# Patient Record
Sex: MALE | Race: WHITE | Employment: OTHER | URBAN - METROPOLITAN AREA
[De-identification: names, ages, dates, MRNs, and addresses within clinical notes are randomized per-mention and may not be internally consistent; named-entity substitution may affect disease eponyms.]

---

## 2018-12-17 LAB
ALBUMIN SERPL-MCNC: NORMAL G/DL
ALP BLD-CCNC: NORMAL U/L
ALT SERPL-CCNC: NORMAL U/L
ANION GAP SERPL CALCULATED.3IONS-SCNC: NORMAL MMOL/L
AST SERPL-CCNC: NORMAL U/L
BASOPHILS ABSOLUTE: NORMAL
BASOPHILS RELATIVE PERCENT: NORMAL
BILIRUB SERPL-MCNC: NORMAL MG/DL
BUN BLDV-MCNC: NORMAL MG/DL
CALCIUM SERPL-MCNC: NORMAL MG/DL
CHLORIDE BLD-SCNC: NORMAL MMOL/L
CHOLESTEROL, TOTAL: NORMAL
CHOLESTEROL/HDL RATIO: NORMAL
CO2: NORMAL
CREAT SERPL-MCNC: NORMAL MG/DL
EOSINOPHILS ABSOLUTE: NORMAL
EOSINOPHILS RELATIVE PERCENT: NORMAL
GFR CALCULATED: NORMAL
GLUCOSE BLD-MCNC: NORMAL MG/DL
HCT VFR BLD CALC: NORMAL %
HDLC SERPL-MCNC: NORMAL MG/DL
HEMOGLOBIN: NORMAL
LDL CHOLESTEROL CALCULATED: NORMAL
LYMPHOCYTES ABSOLUTE: NORMAL
LYMPHOCYTES RELATIVE PERCENT: NORMAL
MCH RBC QN AUTO: NORMAL PG
MCHC RBC AUTO-ENTMCNC: NORMAL G/DL
MCV RBC AUTO: NORMAL FL
MONOCYTES ABSOLUTE: NORMAL
MONOCYTES RELATIVE PERCENT: NORMAL
NEUTROPHILS ABSOLUTE: NORMAL
NEUTROPHILS RELATIVE PERCENT: NORMAL
NONHDLC SERPL-MCNC: NORMAL MG/DL
PDW BLD-RTO: NORMAL %
PLATELET # BLD: NORMAL 10*3/UL
PMV BLD AUTO: NORMAL FL
POTASSIUM SERPL-SCNC: NORMAL MMOL/L
PSA, ULTRASENSITIVE: NORMAL
RBC # BLD: NORMAL 10*6/UL
SODIUM BLD-SCNC: NORMAL MMOL/L
TESTOSTERONE TOTAL: NORMAL
TOTAL PROTEIN: NORMAL
TRIGL SERPL-MCNC: NORMAL MG/DL
VLDLC SERPL CALC-MCNC: NORMAL MG/DL
WBC # BLD: NORMAL 10*3/UL

## 2019-04-29 ENCOUNTER — HOSPITAL ENCOUNTER (OUTPATIENT)
Dept: ULTRASOUND IMAGING | Age: 69
Discharge: HOME | End: 2019-05-01
Payer: MEDICARE

## 2019-04-29 DIAGNOSIS — M71.21 SYNOVIAL CYST OF RIGHT POPLITEAL SPACE: ICD-10-CM

## 2019-04-29 DIAGNOSIS — M79.661 PAIN OF RIGHT CALF: ICD-10-CM

## 2019-04-29 PROCEDURE — 76882 US LMTD JT/FCL EVL NVASC XTR: CPT

## 2019-04-29 PROCEDURE — 93971 EXTREMITY STUDY: CPT

## 2019-12-09 LAB
ALBUMIN SERPL-MCNC: NORMAL G/DL
ALP BLD-CCNC: NORMAL U/L
ALT SERPL-CCNC: NORMAL U/L
ANION GAP SERPL CALCULATED.3IONS-SCNC: NORMAL MMOL/L
AST SERPL-CCNC: NORMAL U/L
BASOPHILS ABSOLUTE: NORMAL
BASOPHILS RELATIVE PERCENT: NORMAL
BILIRUB SERPL-MCNC: NORMAL MG/DL
BUN BLDV-MCNC: NORMAL MG/DL
CALCIUM SERPL-MCNC: NORMAL MG/DL
CHLORIDE BLD-SCNC: NORMAL MMOL/L
CHOLESTEROL, TOTAL: 206 MG/DL
CHOLESTEROL/HDL RATIO: 3.1
CO2: NORMAL
CREAT SERPL-MCNC: NORMAL MG/DL
EOSINOPHILS ABSOLUTE: NORMAL
EOSINOPHILS RELATIVE PERCENT: NORMAL
GFR CALCULATED: NORMAL
GLUCOSE BLD-MCNC: NORMAL MG/DL
HCT VFR BLD CALC: NORMAL %
HDLC SERPL-MCNC: 66 MG/DL (ref 35–70)
HEMOGLOBIN: NORMAL
LDL CHOLESTEROL CALCULATED: 127 MG/DL (ref 0–160)
LYMPHOCYTES ABSOLUTE: NORMAL
LYMPHOCYTES RELATIVE PERCENT: NORMAL
MCH RBC QN AUTO: NORMAL PG
MCHC RBC AUTO-ENTMCNC: NORMAL G/DL
MCV RBC AUTO: NORMAL FL
MONOCYTES ABSOLUTE: NORMAL
MONOCYTES RELATIVE PERCENT: NORMAL
NEUTROPHILS ABSOLUTE: NORMAL
NEUTROPHILS RELATIVE PERCENT: NORMAL
NONHDLC SERPL-MCNC: ABNORMAL MG/DL
PDW BLD-RTO: NORMAL %
PLATELET # BLD: NORMAL 10*3/UL
PMV BLD AUTO: NORMAL FL
POTASSIUM SERPL-SCNC: NORMAL MMOL/L
RBC # BLD: NORMAL 10*6/UL
SODIUM BLD-SCNC: NORMAL MMOL/L
TESTOSTERONE TOTAL: 2.98
TOTAL PROTEIN: NORMAL
TRIGL SERPL-MCNC: 65 MG/DL
VLDLC SERPL CALC-MCNC: 13 MG/DL
WBC # BLD: NORMAL 10*3/UL

## 2020-10-08 ENCOUNTER — OFFICE VISIT (OUTPATIENT)
Dept: PRIMARY CARE CLINIC | Age: 70
End: 2020-10-08
Payer: MEDICARE

## 2020-10-08 ENCOUNTER — HOSPITAL ENCOUNTER (OUTPATIENT)
Age: 70
Discharge: HOME | End: 2020-10-10
Payer: MEDICARE

## 2020-10-08 VITALS
TEMPERATURE: 97.7 F | SYSTOLIC BLOOD PRESSURE: 128 MMHG | BODY MASS INDEX: 23.04 KG/M2 | DIASTOLIC BLOOD PRESSURE: 78 MMHG | OXYGEN SATURATION: 97 % | HEART RATE: 71 BPM | WEIGHT: 156 LBS

## 2020-10-08 PROBLEM — G47.00 INSOMNIA: Status: ACTIVE | Noted: 2020-10-08

## 2020-10-08 PROBLEM — G56.93 BILATERAL NEUROPATHY OF UPPER EXTREMITIES: Status: ACTIVE | Noted: 2020-10-08

## 2020-10-08 PROBLEM — Z98.1 HISTORY OF FUSION OF CERVICAL SPINE: Status: ACTIVE | Noted: 2020-10-08

## 2020-10-08 PROBLEM — Z98.890 HISTORY OF CARPAL TUNNEL RELEASE: Status: ACTIVE | Noted: 2020-10-08

## 2020-10-08 LAB
ALBUMIN SERPL-MCNC: 4 G/DL (ref 3.5–5.2)
ALP BLD-CCNC: 49 U/L (ref 40–129)
ALT SERPL-CCNC: 20 U/L (ref 0–40)
ANION GAP SERPL CALCULATED.3IONS-SCNC: 13 MMOL/L (ref 7–16)
AST SERPL-CCNC: 31 U/L (ref 0–39)
BILIRUB SERPL-MCNC: 0.6 MG/DL (ref 0–1.2)
BUN BLDV-MCNC: 12 MG/DL (ref 8–23)
CALCIUM SERPL-MCNC: 8.9 MG/DL (ref 8.6–10.2)
CHLORIDE BLD-SCNC: 100 MMOL/L (ref 98–107)
CHOLESTEROL, TOTAL: 185 MG/DL (ref 0–199)
CO2: 25 MMOL/L (ref 22–29)
CREAT SERPL-MCNC: 0.8 MG/DL (ref 0.7–1.2)
GFR AFRICAN AMERICAN: >60
GFR NON-AFRICAN AMERICAN: >60 ML/MIN/1.73
GLUCOSE BLD-MCNC: 94 MG/DL (ref 74–99)
HCT VFR BLD CALC: 43.9 % (ref 37–54)
HDLC SERPL-MCNC: 78 MG/DL
HEMOGLOBIN: 14.5 G/DL (ref 12.5–16.5)
LDL CHOLESTEROL CALCULATED: 98 MG/DL (ref 0–99)
MCH RBC QN AUTO: 30 PG (ref 26–35)
MCHC RBC AUTO-ENTMCNC: 33 % (ref 32–34.5)
MCV RBC AUTO: 90.7 FL (ref 80–99.9)
PDW BLD-RTO: 13.9 FL (ref 11.5–15)
PLATELET # BLD: 240 E9/L (ref 130–450)
PMV BLD AUTO: 11.6 FL (ref 7–12)
POTASSIUM SERPL-SCNC: 4.9 MMOL/L (ref 3.5–5)
RBC # BLD: 4.84 E12/L (ref 3.8–5.8)
SODIUM BLD-SCNC: 138 MMOL/L (ref 132–146)
TOTAL PROTEIN: 6.9 G/DL (ref 6.4–8.3)
TRIGL SERPL-MCNC: 47 MG/DL (ref 0–149)
VLDLC SERPL CALC-MCNC: 9 MG/DL
WBC # BLD: 6.3 E9/L (ref 4.5–11.5)

## 2020-10-08 PROCEDURE — 1036F TOBACCO NON-USER: CPT | Performed by: INTERNAL MEDICINE

## 2020-10-08 PROCEDURE — G8484 FLU IMMUNIZE NO ADMIN: HCPCS | Performed by: INTERNAL MEDICINE

## 2020-10-08 PROCEDURE — G8420 CALC BMI NORM PARAMETERS: HCPCS | Performed by: INTERNAL MEDICINE

## 2020-10-08 PROCEDURE — 1123F ACP DISCUSS/DSCN MKR DOCD: CPT | Performed by: INTERNAL MEDICINE

## 2020-10-08 PROCEDURE — 90694 VACC AIIV4 NO PRSRV 0.5ML IM: CPT | Performed by: INTERNAL MEDICINE

## 2020-10-08 PROCEDURE — G0008 ADMIN INFLUENZA VIRUS VAC: HCPCS | Performed by: INTERNAL MEDICINE

## 2020-10-08 PROCEDURE — G8427 DOCREV CUR MEDS BY ELIG CLIN: HCPCS | Performed by: INTERNAL MEDICINE

## 2020-10-08 PROCEDURE — 80053 COMPREHEN METABOLIC PANEL: CPT

## 2020-10-08 PROCEDURE — 80061 LIPID PANEL: CPT

## 2020-10-08 PROCEDURE — 99204 OFFICE O/P NEW MOD 45 MIN: CPT | Performed by: INTERNAL MEDICINE

## 2020-10-08 PROCEDURE — 4040F PNEUMOC VAC/ADMIN/RCVD: CPT | Performed by: INTERNAL MEDICINE

## 2020-10-08 PROCEDURE — 3017F COLORECTAL CA SCREEN DOC REV: CPT | Performed by: INTERNAL MEDICINE

## 2020-10-08 PROCEDURE — 86803 HEPATITIS C AB TEST: CPT

## 2020-10-08 PROCEDURE — 85027 COMPLETE CBC AUTOMATED: CPT

## 2020-10-08 RX ORDER — PREGABALIN 50 MG/1
CAPSULE ORAL
COMMUNITY
Start: 2020-07-30 | End: 2021-04-07

## 2020-10-08 RX ORDER — DIAZEPAM 5 MG/1
5 TABLET ORAL EVERY 6 HOURS PRN
Qty: 90 TABLET | Refills: 0 | Status: SHIPPED
Start: 2020-10-08 | End: 2020-12-01 | Stop reason: SDUPTHER

## 2020-10-08 ASSESSMENT — PATIENT HEALTH QUESTIONNAIRE - PHQ9
SUM OF ALL RESPONSES TO PHQ QUESTIONS 1-9: 0
2. FEELING DOWN, DEPRESSED OR HOPELESS: 0
SUM OF ALL RESPONSES TO PHQ QUESTIONS 1-9: 0
SUM OF ALL RESPONSES TO PHQ9 QUESTIONS 1 & 2: 0
1. LITTLE INTEREST OR PLEASURE IN DOING THINGS: 0

## 2020-10-08 NOTE — PROGRESS NOTES
10/8/20    Ashley Evans, a male of 79 y.o. came to the office    HPI     Ashley Evans presents today as a new patient. He has a history of insomnia and takes Valium for this issue. He admits to using 90 tablets over the course of a calendar year. He also has bilateral arm numbness and tingling. He has a history of an anterior cervical fusion of C6 and C7. Is also had cubital and carpal tunnel release bilaterally. He now takes Lyrica as previously prescribed by his orthopedist.  He states that this is helping minimally along with some exercises that he has been using. Other than above his surgical history is insignificant    His father had prostate cancer in his mother  of a cerebral hemorrhage. He is self-employed as a contractor. He denies any tobacco abuse. He consumes a sixpack of beer a week. He denies any drug use    Current Outpatient Medications on File Prior to Visit   Medication Sig Dispense Refill    pregabalin (LYRICA) 50 MG capsule       Melatonin 5 MG CAPS Take 1 tablet by mouth nightly      NONFORMULARY Tumeric and clogen      vitamin B-1 (THIAMINE) 100 MG tablet Take 100 mg by mouth daily LD 17      vitamin E 1000 units capsule Take by mouth daily  17      Omega-3 Fatty Acids (FISH OIL) 1000 MG CAPS Take 1,000 mg by mouth 3 times daily  17      diazepam (VALIUM) 5 MG tablet Take 5 mg by mouth every 6 hours as needed for Anxiety       No current facility-administered medications on file prior to visit. Review of Systems   Constitutional: Negative for activity change, appetite change, chills and fatigue. HENT: Negative for hearing loss, Negative for congestion. Respiratory: Negative for chest tightness, shortness of breath and wheezing. Cardiovascular: Negative for leg swelling Negative for chest pain and palpitations. Gastrointestinal: Negative for abdominal pain, Negative for abdominal distention, constipation and diarrhea. Genitourinary: Negative for difficulty urinating, dysuria and frequency. Musculoskeletal: Negative for arthralgias, back pain, gait problem and joint swelling. Skin: Negative for color change rash or wound     Neurological: Negative for dizziness, seizures and headaches. Hematological: Negative for adenopathy. Does not bruise/bleed easily. Psychiatric/Behavioral: Negative for agitation, behavioral problems, confusion and decreased concentration. OBJECTIVE:  /78   Pulse 71   Temp 97.7 °F (36.5 °C)   Wt 156 lb (70.8 kg)   SpO2 97%   BMI 23.04 kg/m²      Physical Exam   Constitutional: Oriented to person, place, and time. Appears well-developed and well-nourished. No distress. HENT:   Head: Normocephalic and atraumatic. Eyes: Pupils are equal, round, and reactive to light. EOM are normal.   Neck: Neck supple. No JVD present. No tracheal deviation present. No thyromegaly present. Cardiovascular: Normal rate, regular rhythm, normal heart sounds and intact distal pulses. Exam reveals no gallop and no friction rub. No murmur heard. Pulmonary/Chest: Effort normal and breath sounds normal. No stridor. No respiratory distress. No wheezes or rales. Abdominal: Soft. Bowel sounds are normal. There is no distension nor mass. There is no tenderness. There is no guarding. Musculoskeletal: No edema tenderness or deformity  Neurological: Alert and oriented to person, place, and time. No cranial nerve deficit. Normal muscle tone. Coordination normal.   Skin: Skin is warm and dry. No diaphoresis. No erythema. Psychiatric: Normal mood and affect. Behavior is normal.       ASSESSMENT AND PLAN:    Taylor Caceres was seen today for new patient. Diagnoses and all orders for this visit:    Need for influenza vaccination  -     INFLUENZA, QUADV, ADJUVANTED, 72 YRS =, IM, PF, PREFILL SYR, 0.5ML (FLUAD)    Need for hepatitis C screening test  -     Hepatitis C Antibody;  Future    Screening for

## 2020-10-09 LAB — HEPATITIS C ANTIBODY INTERPRETATION: NORMAL

## 2020-11-23 ENCOUNTER — OFFICE VISIT (OUTPATIENT)
Dept: PRIMARY CARE CLINIC | Age: 70
End: 2020-11-23
Payer: MEDICARE

## 2020-11-23 VITALS
WEIGHT: 161 LBS | DIASTOLIC BLOOD PRESSURE: 80 MMHG | HEIGHT: 69 IN | TEMPERATURE: 98.2 F | SYSTOLIC BLOOD PRESSURE: 130 MMHG | OXYGEN SATURATION: 95 % | HEART RATE: 74 BPM | BODY MASS INDEX: 23.85 KG/M2

## 2020-11-23 PROCEDURE — G8484 FLU IMMUNIZE NO ADMIN: HCPCS | Performed by: INTERNAL MEDICINE

## 2020-11-23 PROCEDURE — 3017F COLORECTAL CA SCREEN DOC REV: CPT | Performed by: INTERNAL MEDICINE

## 2020-11-23 PROCEDURE — 1123F ACP DISCUSS/DSCN MKR DOCD: CPT | Performed by: INTERNAL MEDICINE

## 2020-11-23 PROCEDURE — G0438 PPPS, INITIAL VISIT: HCPCS | Performed by: INTERNAL MEDICINE

## 2020-11-23 PROCEDURE — 4040F PNEUMOC VAC/ADMIN/RCVD: CPT | Performed by: INTERNAL MEDICINE

## 2020-11-23 RX ORDER — SILDENAFIL 50 MG/1
50 TABLET, FILM COATED ORAL DAILY PRN
Qty: 10 TABLET | Refills: 0 | Status: SHIPPED | OUTPATIENT
Start: 2020-11-23 | End: 2020-12-01 | Stop reason: SDUPTHER

## 2020-11-23 RX ORDER — SILDENAFIL 50 MG/1
50 TABLET, FILM COATED ORAL DAILY PRN
Qty: 10 TABLET | Refills: 0 | Status: SHIPPED
Start: 2020-11-23 | End: 2020-11-23 | Stop reason: SDUPTHER

## 2020-11-23 ASSESSMENT — LIFESTYLE VARIABLES
HAVE YOU OR SOMEONE ELSE BEEN INJURED AS A RESULT OF YOUR DRINKING: 0
AUDIT-C TOTAL SCORE: 3
HOW MANY STANDARD DRINKS CONTAINING ALCOHOL DO YOU HAVE ON A TYPICAL DAY: 0
HOW OFTEN DO YOU HAVE A DRINK CONTAINING ALCOHOL: 3
HOW OFTEN DURING THE LAST YEAR HAVE YOU FOUND THAT YOU WERE NOT ABLE TO STOP DRINKING ONCE YOU HAD STARTED: 0
AUDIT TOTAL SCORE: 3
HAS A RELATIVE, FRIEND, DOCTOR, OR ANOTHER HEALTH PROFESSIONAL EXPRESSED CONCERN ABOUT YOUR DRINKING OR SUGGESTED YOU CUT DOWN: 0
HOW OFTEN DURING THE LAST YEAR HAVE YOU BEEN UNABLE TO REMEMBER WHAT HAPPENED THE NIGHT BEFORE BECAUSE YOU HAD BEEN DRINKING: 0
HOW OFTEN DURING THE LAST YEAR HAVE YOU HAD A FEELING OF GUILT OR REMORSE AFTER DRINKING: 0
HOW OFTEN DURING THE LAST YEAR HAVE YOU FAILED TO DO WHAT WAS NORMALLY EXPECTED FROM YOU BECAUSE OF DRINKING: 0
HOW OFTEN DURING THE LAST YEAR HAVE YOU NEEDED AN ALCOHOLIC DRINK FIRST THING IN THE MORNING TO GET YOURSELF GOING AFTER A NIGHT OF HEAVY DRINKING: 0
HOW OFTEN DO YOU HAVE SIX OR MORE DRINKS ON ONE OCCASION: 0

## 2020-11-23 ASSESSMENT — PATIENT HEALTH QUESTIONNAIRE - PHQ9
SUM OF ALL RESPONSES TO PHQ QUESTIONS 1-9: 0
2. FEELING DOWN, DEPRESSED OR HOPELESS: 0
SUM OF ALL RESPONSES TO PHQ QUESTIONS 1-9: 0
SUM OF ALL RESPONSES TO PHQ QUESTIONS 1-9: 0
1. LITTLE INTEREST OR PLEASURE IN DOING THINGS: 0
SUM OF ALL RESPONSES TO PHQ9 QUESTIONS 1 & 2: 0

## 2020-11-23 NOTE — PROGRESS NOTES
Medicare Annual Wellness Visit  Name: Kevin Zambrano Date: 2020   MRN: [id number] Sex: Male   Age: 79 y.o. Ethnicity: Non-/Non    : 1950 Race: Nata Malloy is here for Medicare AWV (yearly) and Other (discuss lyrica increasing will need)    Screenings for behavioral, psychosocial and functional/safety risks, and cognitive dysfunction are all negative except as indicated below. These results, as well as other patient data from the [address] form, are documented in Flowsheets linked to this Encounter. He was tested for coronavirus which was negative. He was traveling to [place]. He does not have any symptoms. He has been using sildenafil 50 mg for erectile dysfunction. No Known Allergies      Prior to Visit Medications    Medication Sig Taking? Authorizing Provider   sildenafil (VIAGRA) 50 MG tablet Take 1 tablet by mouth daily as needed for Erectile Dysfunction Yes Mauro Haley, DO   LYRICA 75 MG capsule Take 1 capsule by mouth 2 times daily for 60 doses. Yes Mauro Haley, DO   pregabalin (LYRICA) 50 MG capsule  Yes Historical Provider, MD   Melatonin 5 MG CAPS Take 1 tablet by mouth nightly Yes Historical Provider, MD   diazePAM (VALIUM) 5 MG tablet Take 1 tablet by mouth every 6 hours as needed for Sleep for up to 90 doses.  Yes Michael Fontenot, DO   NONFORMULARY Tumeric and clogen Yes Historical Provider, MD   vitamin B-1 (THIAMINE) 100 MG tablet Take 100 mg by mouth daily LD 17 Yes Historical Provider, MD   vitamin E 1000 units capsule Take by mouth daily LD 17 Yes Historical Provider, MD   Omega-3 Fatty Acids (FISH OIL) 1000 MG CAPS Take 1,000 mg by mouth 3 times daily LD 17 Yes Historical Provider, MD         Past Medical History:   Diagnosis Date    Insomnia     problems sleeping through the night     Psychiatric problem        Past Surgical History:   Procedure Laterality Date    CARPAL TUNNEL RELEASE      bilat hands     CERVICAL FUSION  2011    C6 & C7    SEPTOPLASTY  12/26/2017    FESS    TONSILLECTOMY         No family history on file. CareTeam (Including outside providers/suppliers regularly involved in providing care):   Patient Care Team:  Ezequiel Valerio DO as PCP - General (Internal Medicine)  Landon Ledbetter DO as Consulting Physician (Otolaryngology)    Wt Readings from Last 3 Encounters:   11/23/20 161 lb (73 kg)   10/08/20 156 lb (70.8 kg)   02/26/18 167 lb 8 oz (76 kg)     Vitals:    11/23/20 0846   BP: 130/80   Pulse: 74   Temp: 98.2 °F (36.8 °C)   SpO2: 95%   Weight: 161 lb (73 kg)   Height: 5' 9\" (1.753 m)     Body mass index is 23.78 kg/m². Based upon direct observation of the patient, evaluation of cognition reveals recent and remote memory intact.     General Appearance: alert and oriented to person, place and time, well developed and well- nourished, in no acute distress  Skin: warm and dry, no rash or erythema  Head: normocephalic and atraumatic  Eyes: pupils equal, round, and reactive to light, extraocular eye movements intact, conjunctivae normal  ENT: tympanic membrane, external ear and ear canal normal bilaterally, nose without deformity, nasal mucosa and turbinates normal without polyps  Neck: supple and non-tender without mass, no thyromegaly or thyroid nodules, no cervical lymphadenopathy  Pulmonary/Chest: clear to auscultation bilaterally- no wheezes, rales or rhonchi, normal air movement, no respiratory distress  Cardiovascular: normal rate, regular rhythm, normal S1 and S2, no murmurs, rubs, clicks, or gallops, distal pulses intact, no carotid bruits  Abdomen: soft, non-tender, non-distended, normal bowel sounds, no masses or organomegaly  Extremities: no cyanosis, clubbing or edema  Musculoskeletal: normal range of motion, no joint swelling, deformity or tenderness  Neurologic: reflexes normal and symmetric, no cranial nerve deficit, gait, coordination and speech normal    Patient's complete Health Risk Assessment and screening values have been reviewed and are found in Flowsheets. The following problems were reviewed today and where indicated follow up appointments were made and/or referrals ordered. Positive Risk Factor Screenings with Interventions:       General Health and ACP:     Advance Directives     Power of  Living Will ACP-Advance Directive ACP-Power of     Not on File Not on File Filed [address] Risk Interventions:  · No Living Will: Was advised to obtain a living well    Personalized Preventive Plan   Current Health Maintenance Status  Immunization History   Administered Date(s) Administered    Influenza, Quadv, adjuvanted, 65 yrs +, IM, PF (Fluad) 10/08/2020    Tdap (Boostrix, Adacel) 09/26/2015        Health Maintenance   Topic Date Due    AAA screen  1950    Shingles Vaccine (1 of 2) 05/04/2000    Colon cancer screen colonoscopy  05/04/2000    Pneumococcal 65+ years Vaccine (1 of 1 - PPSV23) 05/04/2015    Annual Wellness Visit (AWV)  06/23/2019    DTaP/Tdap/Td vaccine (2 - Td) 09/26/2025    Lipid screen  10/08/2025    Flu vaccine  Completed    Hepatitis C screen  Completed    Hepatitis A vaccine  Aged Out    Hepatitis B vaccine  Aged Out    Hib vaccine  Aged Out    Meningococcal (ACWY) vaccine  Aged Out     Recommendations for [employer] Due: see orders and patient instructions/AVS.  . Recommended screening schedule for the next 5-10 years is provided to the patient in written form: see Patient Instructions/AVS.    Liam Ruggiero was seen today for medicare awv and other. Diagnoses and all orders for this visit:    Routine general medical examination at a health care facility    Erectile dysfunction, unspecified erectile dysfunction type  -     sildenafil (VIAGRA) 50 MG tablet;  Take 1 tablet by mouth daily as needed for Erectile Dysfunction    Bilateral neuropathy of upper extremities  -     LYRICA 75 MG capsule; Take 1 capsule by mouth 2 times daily for 60 doses.

## 2020-11-23 NOTE — PATIENT INSTRUCTIONS
Advance Directives: Care Instructions  Overview  An advance directive is a legal way to state your wishes at the end of your life. It tells your family and your doctor what to do if you can't say what you want. There are two main types of advance directives. You can change them any time your wishes change. Living will. This form tells your family and your doctor your wishes about life support and other treatment. The form is also called a declaration. Medical power of . This form lets you name a person to make treatment decisions for you when you can't speak for yourself. This person is called a health care agent (health care proxy, health care surrogate). The form is also called a durable power of  for health care. If you do not have an advance directive, decisions about your medical care may be made by a family member, or by a doctor or a  who doesn't know you. It may help to think of an advance directive as a gift to the people who care for you. If you have one, they won't have to make tough decisions by themselves. Follow-up care is a key part of your treatment and safety. Be sure to make and go to all appointments, and call your doctor if you are having problems. It's also a good idea to know your test results and keep a list of the medicines you take. What should you include in an advance directive? Many states have a unique advance directive form. (It may ask you to address specific issues.) Or you might use a universal form that's approved by many states. If your form doesn't tell you what to address, it may be hard to know what to include in your advance directive. Use the questions below to help you get started. · Who do you want to make decisions about your medical care if you are not able to? · What life-support measures do you want if you have a serious illness that gets worse over time or can't be cured? · What are you most afraid of that might happen? (Maybe you're afraid of having pain, losing your independence, or being kept alive by machines.)  · Where would you prefer to die? (Your home? A hospital? A nursing home?)  · Do you want to donate your organs when you die? · Do you want certain Caodaism practices performed before you die? When should you call for help? Be sure to contact your doctor if you have any questions. Where can you learn more? Go to https://chpepiceweb.[employer]. org and sign in to your [employer] account. Enter R264 in the [employer] box to learn more about \"Advance Directives: Care Instructions. \"     If you do not have an account, please click on the \"Sign Up Now\" link. Current as of: December 9, 2019               Content Version: 12.6  © 7165-7784 [employer]. Care instructions adapted under license by [facility] ([facility]). If you have questions about a medical condition or this instruction, always ask your healthcare professional. [place] [address] any warranty or liability for your use of this information. Learning About Medical Power of   What is a medical power of ? A medical power of , also called a durable power of  for health care, is one type of the legal forms called advance directives. It lets you name the person you want to make treatment decisions for you if you can't speak or decide for yourself. The person you choose is called your health care agent. This person is also called a health care proxy or health care surrogate. A medical power of  may be called something else in your state. How do you choose a health care agent? Choose your health care agent carefully. This person may or may not be a family member. Talk to the person before you make your final decision. Make sure he or she is comfortable with this responsibility. It's a good idea to choose someone who:  · Is at least 25years old.   · Knows you well and understands what makes life meaningful for you. · Understands your Jewish and moral values. · Will do what you want, not what he or she wants. · Will be able to make difficult choices at a stressful time. · Will be able to refuse or stop treatment, if that is what you would want, even if you could die. · Will be firm and confident with health professionals if needed. · Will ask questions to get needed information. · Lives near you or agrees to travel to you if needed. Your family may help you make medical decisions while you can still be part of that process. But it's important to choose one person to be your health care agent in case you aren't able to make decisions for yourself. If you don't fill out the legal form and name a health care agent, the decisions your family can make may be limited. A health care agent may be called something else in your state. Who will make decisions for you if you don't have a health care agent? If you don't have a health care agent or a living will, you may not get the care you want. Decisions may be made by family members who disagree about your medical care. Or decisions may be made by a medical professional who doesn't know you well. In some cases, a  makes the decisions. When you name a health care agent, it is very clear who has the power to make health decisions for you. How do you name a health care agent? You name your health care agent on a legal form. This form is usually called a medical power of . Ask your hospital, state bar association, or office on aging where to find these forms. You must sign the form to make it legal. Some states require you to get the form notarized. This means that a person called a  watches you sign the form and then he or she signs the form. Some states also require that two or more witnesses sign the form. Be sure to tell your family members and doctors who your health care agent is.   Where can you learn more? Go to https://chpepiceweb.[employer]. org and sign in to your [email or web address] account. Enter [id number] in the [facility] box to learn more about \"Learning About [address]. \"     If you do not have an account, please click on the \"Sign Up Now\" link. Current as of: December 9, 2019               Content Version: 12.6  © 5578-4905 [employer]. Care instructions adapted under license by [facility] ([facility]). If you have questions about a medical condition or this instruction, always ask your healthcare professional. [address] any warranty or liability for your use of this information. Learning About Living Perroy  What is a living will? A living will, also called a declaration, is a legal form. It tells your family and your doctor your wishes when you can't speak for yourself. It's used by the health professionals who will treat you as you near the end of your life or if you get seriously hurt or ill. If you put your wishes in writing, your loved ones and others will know what kind of care you want. They won't need to guess. This can ease your mind and be helpful to others. And you can change or cancel your living will at any time. A living will is not the same as an estate or property will. An estate will explains what you want to happen with your money and property after you die. How do you use it? A living will is used to describe the kinds of treatment or life support you want as you near the end of your life or if you get seriously hurt or ill. Keep these facts in mind about living fenton. · Your living will is used only if you can't speak or make decisions for yourself. Most often, one or more doctors must certify that you can't speak or decide for yourself before your living will takes effect. · If you get better and can speak for yourself again, you can accept or refuse any treatment.  It doesn't matter what you said in your living will. · Some states may limit your right to refuse treatment in certain cases. For example, you may need to clearly state in your living will that you don't want artificial hydration and nutrition, such as being fed through a tube. Is a living will a legal document? A living will is a legal document. Each state has its own laws about living fenton. And a living will may be called something else in your state. Here are some things to know about living fenton. · You don't need an  to complete a living will. But legal advice can be helpful if your state's laws are unclear. It can also help if your health history is complicated or your family can't agree on what should be in your living will. · You can change your living will at any time. Some people find that their wishes about end-of-life care change as their health changes. If you make big changes to your living will, complete a new form. · If you move to another state, make sure that your living will is legal in the state where you now live. In most cases, doctors will respect your wishes even if you have a form from a different state. · You might use a universal form that has been approved by many states. This kind of form can sometimes be filled out and stored online. Your digital copy will then be available wherever you have a connection to the internet. The doctors and nurses who need to treat you can find it right away. · Your state may offer an online registry. This is another place where you can store your living will online. · It's a good idea to get your living will notarized. This means using a person called a  to watch two people sign, or witness, your living will. What should you know when you create a living will? Here are some questions to ask yourself as you make your living will:  · Do you know enough about life support methods that might be used?  If not, talk to your doctor so you know what might be done if you can't breathe on your own, your heart stops, or you can't swallow. · What things would you still want to be able to do after you receive life-support methods? Would you want to be able to walk? To speak? To eat on your own? To live without the help of machines? · Do you want certain Tenriism practices performed if you become very ill? · If you have a choice, where do you want to be cared for? In your home? At a hospital or nursing home? · If you have a choice at the end of your life, where would you prefer to die? At home? In a hospital or nursing home? Somewhere else? · Would you prefer to be buried or cremated? · Do you want your organs to be donated after you die? What should you do with your living will? · Make sure that your family members and your health care agent have copies of your living will (also called a declaration). · Give your doctor a copy of your living will. Ask him or her to keep it as part of your medical record. If you have more than one doctor, make sure that each one has a copy. · Put a copy of your living will where it can be easily found. For example, some people may put a copy on their refrigerator door. If you are using a digital copy, be sure your doctor, family members, and health care agent know how to find and access it. Where can you learn more? Go to https://chpepiceweb.The [employer] website. org and sign in to your [employer] account. Enter V943 in the [facility] box to learn more about \"Learning About Living Perrocharity. \"     If you do not have an account, please click on the \"Sign Up Now\" link. Current as of: December 9, 2019               Content Version: 12.6  © 0820-8813 [employer]. Care instructions adapted under license by [facility] ([facility]).  If you have questions about a medical condition or this instruction, always ask your healthcare professional. [address] any warranty or liability for your use of this information. Personalized Preventive Plan for Celio Cox - 11/23/2020  Medicare offers a range of preventive health benefits. Some of the tests and screenings are paid in full while other may be subject to a deductible, co-insurance, and/or copay. Some of these benefits include a comprehensive review of your medical history including lifestyle, illnesses that may run in your family, and various assessments and screenings as appropriate. After reviewing your medical record and screening and assessments performed today your provider may have ordered immunizations, labs, imaging, and/or referrals for you. A list of these orders (if applicable) as well as your Preventive Care list are included within your After Visit Summary for your review. Other Preventive Recommendations:    · A preventive eye exam performed by an eye specialist is recommended every 1-2 years to screen for glaucoma; cataracts, macular degeneration, and other eye disorders. · A preventive dental visit is recommended every 6 months. · Try to get at least 150 minutes of exercise per week or 10,000 steps per day on a pedometer . · Order or download the FREE \"Exercise & Physical Activity: Your Everyday Guide\" from [employer]. Call 3-766.912.6682 or search [employer] online. · You need 0944-1982 mg of calcium and 8496-7561 IU of vitamin D per day. It is possible to meet your calcium requirement with diet alone, but a vitamin D supplement is usually necessary to meet this goal.  · When exposed to the sun, use a sunscreen that protects against both UVA and UVB radiation with an SPF of 30 or greater. Reapply every 2 to 3 hours or after sweating, drying off with a towel, or swimming. · Always wear a seat belt when traveling in a car. Always wear a helmet when riding a bicycle or motorcycle.

## 2020-12-01 ENCOUNTER — TELEPHONE (OUTPATIENT)
Dept: PRIMARY CARE CLINIC | Age: 70
End: 2020-12-01

## 2020-12-01 RX ORDER — DIAZEPAM 5 MG/1
5 TABLET ORAL EVERY 6 HOURS PRN
Qty: 90 TABLET | Refills: 0 | Status: SHIPPED | OUTPATIENT
Start: 2020-12-01 | End: 2021-03-03

## 2020-12-01 RX ORDER — SILDENAFIL 50 MG/1
25 TABLET, FILM COATED ORAL DAILY PRN
Qty: 10 TABLET | Refills: 0 | Status: SHIPPED | OUTPATIENT
Start: 2020-12-01 | End: 2021-04-07

## 2020-12-01 RX ORDER — SILDENAFIL CITRATE 25 MG
25 TABLET ORAL PRN
Qty: 30 TABLET | Refills: 1 | Status: SHIPPED | OUTPATIENT
Start: 2020-12-01 | End: 2021-11-15 | Stop reason: SDUPTHER

## 2020-12-01 RX ORDER — PREGABALIN 75 MG/1
75 CAPSULE ORAL 2 TIMES DAILY
Qty: 60 CAPSULE | Refills: 1 | Status: SHIPPED | OUTPATIENT
Start: 2020-12-01 | End: 2021-01-22 | Stop reason: SDUPTHER

## 2021-01-22 DIAGNOSIS — G56.93 BILATERAL NEUROPATHY OF UPPER EXTREMITIES: ICD-10-CM

## 2021-01-22 RX ORDER — PREGABALIN 75 MG/1
75 CAPSULE ORAL 2 TIMES DAILY
Qty: 60 CAPSULE | Refills: 3 | Status: SHIPPED
Start: 2021-01-22 | End: 2021-04-01 | Stop reason: SDUPTHER

## 2021-02-01 ENCOUNTER — IMMUNIZATION (OUTPATIENT)
Dept: PRIMARY CARE CLINIC | Age: 71
End: 2021-02-01
Payer: MEDICARE

## 2021-02-01 DIAGNOSIS — Z23 NEED FOR VACCINATION: Primary | ICD-10-CM

## 2021-02-01 PROCEDURE — 91300 COVID-19, PFIZER VACCINE 30MCG/0.3ML DOSE: CPT | Performed by: PHYSICIAN ASSISTANT

## 2021-02-01 PROCEDURE — 0001A COVID-19, PFIZER VACCINE 30MCG/0.3ML DOSE: CPT | Performed by: PHYSICIAN ASSISTANT

## 2021-03-01 ENCOUNTER — IMMUNIZATION (OUTPATIENT)
Dept: PRIMARY CARE CLINIC | Age: 71
End: 2021-03-01
Payer: MEDICARE

## 2021-03-01 PROCEDURE — 0002A COVID-19, PFIZER VACCINE 30MCG/0.3ML DOSE: CPT | Performed by: NURSE PRACTITIONER

## 2021-03-01 PROCEDURE — 91300 COVID-19, PFIZER VACCINE 30MCG/0.3ML DOSE: CPT | Performed by: NURSE PRACTITIONER

## 2021-04-01 ENCOUNTER — OFFICE VISIT (OUTPATIENT)
Dept: PRIMARY CARE CLINIC | Age: 71
End: 2021-04-01
Payer: MEDICARE

## 2021-04-01 VITALS
HEART RATE: 86 BPM | SYSTOLIC BLOOD PRESSURE: 110 MMHG | DIASTOLIC BLOOD PRESSURE: 72 MMHG | HEIGHT: 69 IN | BODY MASS INDEX: 24.44 KG/M2 | WEIGHT: 165 LBS | TEMPERATURE: 97.1 F

## 2021-04-01 DIAGNOSIS — Z98.890 HISTORY OF CARPAL TUNNEL RELEASE: Primary | ICD-10-CM

## 2021-04-01 DIAGNOSIS — G56.93 BILATERAL NEUROPATHY OF UPPER EXTREMITIES: ICD-10-CM

## 2021-04-01 DIAGNOSIS — H93.13 TINNITUS OF BOTH EARS: ICD-10-CM

## 2021-04-01 PROCEDURE — 1123F ACP DISCUSS/DSCN MKR DOCD: CPT | Performed by: INTERNAL MEDICINE

## 2021-04-01 PROCEDURE — 4040F PNEUMOC VAC/ADMIN/RCVD: CPT | Performed by: INTERNAL MEDICINE

## 2021-04-01 PROCEDURE — 99213 OFFICE O/P EST LOW 20 MIN: CPT | Performed by: INTERNAL MEDICINE

## 2021-04-01 PROCEDURE — 3017F COLORECTAL CA SCREEN DOC REV: CPT | Performed by: INTERNAL MEDICINE

## 2021-04-01 PROCEDURE — G8427 DOCREV CUR MEDS BY ELIG CLIN: HCPCS | Performed by: INTERNAL MEDICINE

## 2021-04-01 PROCEDURE — 1036F TOBACCO NON-USER: CPT | Performed by: INTERNAL MEDICINE

## 2021-04-01 PROCEDURE — G8420 CALC BMI NORM PARAMETERS: HCPCS | Performed by: INTERNAL MEDICINE

## 2021-04-01 RX ORDER — PREGABALIN 150 MG/1
150 CAPSULE ORAL 2 TIMES DAILY
Qty: 60 CAPSULE | Refills: 3 | Status: SHIPPED
Start: 2021-04-01 | End: 2021-09-21

## 2021-04-01 SDOH — ECONOMIC STABILITY: INCOME INSECURITY: HOW HARD IS IT FOR YOU TO PAY FOR THE VERY BASICS LIKE FOOD, HOUSING, MEDICAL CARE, AND HEATING?: NOT HARD AT ALL

## 2021-04-01 ASSESSMENT — PATIENT HEALTH QUESTIONNAIRE - PHQ9
SUM OF ALL RESPONSES TO PHQ QUESTIONS 1-9: 0
SUM OF ALL RESPONSES TO PHQ9 QUESTIONS 1 & 2: 0
SUM OF ALL RESPONSES TO PHQ QUESTIONS 1-9: 0
SUM OF ALL RESPONSES TO PHQ QUESTIONS 1-9: 0
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
## 2021-04-05 ENCOUNTER — HOSPITAL ENCOUNTER (OUTPATIENT)
Dept: NEUROLOGY | Age: 71
Discharge: HOME | End: 2021-04-05
Payer: MEDICARE

## 2021-04-05 DIAGNOSIS — G56.93 BILATERAL NEUROPATHY OF UPPER EXTREMITIES: ICD-10-CM

## 2021-04-05 DIAGNOSIS — G56.93 BILATERAL NEUROPATHY OF UPPER EXTREMITIES: Primary | ICD-10-CM

## 2021-04-05 PROCEDURE — 95912 NRV CNDJ TEST 11-12 STUDIES: CPT

## 2021-04-05 PROCEDURE — 95886 MUSC TEST DONE W/N TEST COMP: CPT

## 2021-04-05 NOTE — PROCEDURES
[facility]  Electrodiagnostic Laboratory  Oralia        Full Name: Jackie Gant Gender: Male  MRN: [id number] YOB: 1950  Location[de-identified] [facility]      Visit Date: 4/5/2021 08:14  Age: 79 Years 6 Months Old  Examining Physician: Dr. Curry Master   Referring Physician: Dr. Romayne Ang  Technician: Colette Barreto   Height: 5 feet 8 inch  Weight: 160 lbs  Notes: Neuropathy of uppers      Motor NCS      Nerve / Sites Lat. Amplitude Amp. 1-2 Distance Lat Diff Velocity Temp.    ms mV % cm ms m/s °C   R Median - APB      Wrist 5.16 10.9 100 8   32      Elbow 9.95 10.5 95.6 22 4.79 46 32   L Median - APB      Wrist 5.52 7.8 100 8   32      Elbow 10.10 7.5 96.6 21 4.58 46 32   R Ulnar - ADM      Wrist 5.73 7.1 100 8   32      B. Elbow 8.54 4.2 59.8 19 2.81 68 32      A. Elbow 11.51 3.9 54.9 10 2.97 34 32   L Ulnar - ADM      Wrist 4.22 7.6 100 8   32.1      B. Elbow 8.54 6.4 85.3 19 4.32 44 32.1      A. Elbow 11.51 6.3 82.9 10 2.97 34 32.1       Sensory NCS      Nerve / Sites Onset Lat Peak Lat PP Amp Distance Velocity Temp.    ms ms µV cm m/s °C   R Median - Digit II (Antidromic)      Mid Palm 1.56 2.55 13.5 7 45 32.1      Wrist 2.92 4.48 11.2 14 48 32.1   L Median - Digit II (Antidromic)      Mid Palm 1.72 2.45 18.5 7 41 32.1      Wrist 3.44 4.38 16.9 14 41 32   R Ulnar - Digit V (Antidromic)      Wrist 5.63 7.76 71.5 14 25 32   L Ulnar - Digit V (Antidromic)      Wrist 3.80 4.90 17.4 14 37 32.2   R Radial - Anatomical snuff box (Forearm)      Forearm 2.19 3.02 9.5 10 46 32   L Radial - Anatomical snuff box (Forearm)      Forearm 2.14 2.92 10.4 10 47 32.1   R Dorsal ulnar cutaneous - Hand dorsum (Forearm)      Forearm NR NR NR 8 NR 32       F  Wave      Nerve F Lat M Lat F-M Lat    ms ms ms   R Median - APB 34.3 2.6 31.7   R Ulnar - ADM 33.6 5.2 28.4   L Median - APB 33.1 4.9 28.2   L Ulnar - ADM 36.6 4.4 32.2       EMG         EMG Summary Table     Spontaneous MUAP Recruitment   Muscle IA Fib PSW Fasc H.F. Amp Dur. PPP Pattern   L. Cervical paraspinals (low) Incr None None None 2+ Serrated 2+ 2+ 2+ N   R. Cervical paraspinals (low) Incr None None None 2+ Serrated 2+ 2+ 2+ N   L. Deltoid Incr None None None None N N 1+ Inc #   L. Triceps brachii N None None None None N N N N   R. Triceps brachii N None None None None N N N N   R. Biceps brachii N None None None 1+ Serrated N N 2+ Inc #   R. Brachioradialis N None None None 1+ Serrated N 1+ 1+ N   L. Flexor carpi ulnaris Incr None None None 2+ Serrated N 1+ 2+ N   R. Flexor carpi ulnaris Incr None None None 2+ Serrated N 1+ 2+ N   L. Flexor pollicis longus N None None None None N N N N   R. Flexor pollicis longus N None None None None N N N N   R. & L Abductor pollicis brevis Incr None Occasional None 1+ Serrated 1+ N 2+ Sl Decr #   L. First dorsal interosseous N Occasional 1+ None 1+ Serrated 2+ 2+ 2+ Decr #   R. First dorsal interosseous Incr None Occasional None 2+ Serrated 2+ 2+ 2+ Decr #   L. Abductor digiti minimi (pedis) Incr 2+ 3+ None 3+ Serrated 2+ 2+ 3+ Markedly Decr #   R. Abductor digiti minimi (pedis) Incr 1+ 2+ None 2+ Serrated 2+ 2+ 3+ Markedly Decr #         This is not the first electrodiagnostic study for Claude Owesn. His last upper extremity electromyographic examination was in September 2014. This was used as a baseline for comparison. Although technique for measurement different than what are present lab utilizes. He was advised as to the indications, contraindications, benefits and risks of this examination. He voiced understanding and consent. This examination was abnormal and based upon complaints of numbness and tingling and weakness in the upper extremities. Summary:  Nerve conduction studies in the upper extremities revealed prolongation of the mixed motor latency of the median nerve bilaterally, worse on the left.   Amplitudes were slightly reduced on the left as compared to the right however both were within normal limits for his age. Velocity was slightly slowed bilaterally. Ulnar mixed motor latency again prolonged, worse on the right. Amplitudes normal at the wrist, diminished above elbow for right ulnar nerve. Velocity normal in the far forearm segment on the right nerve, slowing across the olecranon segment on the right as well as the left. .    Sensory studies in the upper extremities demonstrate peak latency to be prolonged for the median nerve antidromic technique bilaterally. Amplitudes were within normal limits. Bilateral antidromic stimulation of the ulnar nerves demonstrated prolonged peak latencies, normal amplitude on the right, and left ulnar as compared to the right reduced however within normal limits for age. Radial latencies normal.  Right dorsal ulnar cutaneous nerve was attempted however no response. .    F waves slightly prolonged for both median and ulnar nerves bilaterally. Insertional activity in the upper extremity revealing the following: In the abductor pollicis brevis occasional positive wave serrated potentials, polyphasic units, increased amplitude and diminished number found bilaterally. In the first dorsal interosseous occasional positive waves, fibrillation potentials, serrated potentials, polyphasic units, with marked increased amplitude and duration changes with diminished number. Similar changes however more severe were found in the abductor digiti quinti bilaterally. In the lower cervical paraspinals serrated potentials, polyphasic units, increased amplitude and duration noted, no myotonic discharges or fibrillation potentials were noted. .. Impression:      #1  Median nerve stimulation demonstrated evidence of a median neuropathy at the wrist.  The results were compared to the previous study of 2014 and demonstrated progressive delay in conduction across the carpal ligament. However, there was no significant change in amplitude velocity.     #2

## 2021-04-05 NOTE — LETTER
RE:  Rehan Shae    Dear Dr. Jun Bang,     Enclosed you will find a copy of the requested EMG on your patient, Rehan Kaufman completed 4/5/2021. EMG Findings:     Morgan Crandall MD   Physician   Internal Medicine   Procedures   Signed   Date of Service:  4/5/2021  9:00 AM            Procedure Orders   EMG [[id number]] ordered by  at 04/01/21 1034   Pre-procedure Diagnoses   Bilateral neuropathy of upper extremities [G56.93]   Procedures   EMG [NEU5]          Signed             Show:Clear all  [x]Manual[x]Template[x]Copied    Added by:  Rinku Huang MD    []Leonard for details   [address] [place] [address]  Electrodiagnostic Laboratory  [facility]         Full Name:    Rehan Kaufman                    Gender:             Male  MRN:             [id number]                              YOB: 1950  Location[de-identified]     [facility] ([facility])      Visit Date:                          4/5/2021 08:14  Age:                                   79 Years 6 Months Old  Examining Physician:      Dr. Bianca Holliday   Referring Physician:        Dr. Benites Door  Technician:                       Vale Patiño   Height:                               5 feet 8 inch  Weight:                              160 lbs  Notes:                                Neuropathy of uppers      Motor NCS      Nerve / Sites Lat. Amplitude Amp. 1-2 Distance Lat Diff Velocity Temp.     ms mV % cm ms m/s °C   R Median - APB      Wrist 5.16 10.9 100 8     32      Elbow 9.95 10.5 95.6 22 4.79 46 32   L Median - APB      Wrist 5.52 7.8 100 8     32      Elbow 10.10 7.5 96.6 21 4.58 46 32   R Ulnar - ADM      Wrist 5.73 7.1 100 8     32      B. Elbow 8.54 4.2 59.8 19 2.81 68 32      A. Elbow 11.51 3.9 54.9 10 2.97 34 32   L Ulnar - ADM      Wrist 4.22 7.6 100 8     32.1      B. Elbow 8.54 6.4 85.3 19 4.32 44 32.1      A. Elbow 11.51 6.3 82.9 10 2.97 34 32.1       Sensory NCS      Nerve / Sites Onset Lat Peak Lat PP Amp Distance Velocity Temp.     ms ms µV cm m/s °C   R Median - Digit II (Antidromic)      Mid Palm 1.56 2.55 13.5 7 45 32.1      Wrist 2.92 4.48 11.2 14 48 32.1   L Median - Digit II (Antidromic)      Mid Palm 1.72 2.45 18.5 7 41 32.1      Wrist 3.44 4.38 16.9 14 41 32   R Ulnar - Digit V (Antidromic)      Wrist 5.63 7.76 71.5 14 25 32   L Ulnar - Digit V (Antidromic)      Wrist 3.80 4.90 17.4 14 37 32.2   R Radial - Anatomical snuff box (Forearm)      Forearm 2.19 3.02 9.5 10 46 32   L Radial - Anatomical snuff box (Forearm)      Forearm 2.14 2.92 10.4 10 47 32.1   R Dorsal ulnar cutaneous - Hand dorsum (Forearm)      Forearm NR NR NR 8 NR 32       F  Wave      Nerve F Lat M Lat F-M Lat     ms ms ms   R Median - APB 34.3 2.6 31.7   R Ulnar - ADM 33.6 5.2 28.4   L Median - APB 33.1 4.9 28.2   L Ulnar - ADM 36.6 4.4 32.2       EMG                     EMG Summary Table       Spontaneous MUAP Recruitment   Muscle IA Fib PSW Fasc H.F. Amp Dur. PPP Pattern   L. Cervical paraspinals (low) Incr None None None 2+ Serrated 2+ 2+ 2+ N   R. Cervical paraspinals (low) Incr None None None 2+ Serrated 2+ 2+ 2+ N   L. Deltoid Incr None None None None N N 1+ Inc #   L. Triceps brachii N None None None None N N N N   R. Triceps brachii N None None None None N N N N   R. Biceps brachii N None None None 1+ Serrated N N 2+ Inc #   R. Brachioradialis N None None None 1+ Serrated N 1+ 1+ N   L. Flexor carpi ulnaris Incr None None None 2+ Serrated N 1+ 2+ N   R. Flexor carpi ulnaris Incr None None None 2+ Serrated N 1+ 2+ N   L. Flexor pollicis longus N None None None None N N N N   R. Flexor pollicis longus N None None None None N N N N   R. & L Abductor pollicis brevis Incr None Occasional None 1+ Serrated 1+ N 2+ Sl Decr #   L. First dorsal interosseous N Occasional 1+ None 1+ Serrated 2+ 2+ 2+ Decr #   R. First dorsal interosseous Incr None Occasional None 2+ Serrated 2+ 2+ 2+ Decr #   L.  Abductor digiti minimi (pedis) Incr 2+ 3+ None 3+ Serrated 2+ 2+ 3+ Markedly Decr #   R. Abductor digiti minimi (pedis) Incr 1+ 2+ None 2+ Serrated 2+ 2+ 3+ Markedly Decr #          This is not the first electrodiagnostic study for Hansa Joaquin. His last upper extremity electromyographic examination was in September 2014. This was used as a baseline for comparison. Although technique for measurement different than what are present lab utilizes. He was advised as to the indications, contraindications, benefits and risks of this examination. He voiced understanding and consent. This examination was abnormal and based upon complaints of numbness and tingling and weakness in the upper extremities.     Summary:  Nerve conduction studies in the upper extremities revealed prolongation of the mixed motor latency of the median nerve bilaterally, worse on the left. Amplitudes were slightly reduced on the left as compared to the right however both were within normal limits for his age. Velocity was slightly slowed bilaterally.     Ulnar mixed motor latency again prolonged, worse on the right. Amplitudes normal at the wrist, diminished above elbow for right ulnar nerve. Velocity normal in the far forearm segment on the right nerve, slowing across the olecranon segment on the right as well as the left. .     Sensory studies in the upper extremities demonstrate peak latency to be prolonged for the median nerve antidromic technique bilaterally. Amplitudes were within normal limits. Bilateral antidromic stimulation of the ulnar nerves demonstrated prolonged peak latencies, normal amplitude on the right, and left ulnar as compared to the right reduced however within normal limits for age. Radial latencies normal.  Right dorsal ulnar cutaneous nerve was attempted however no response. .     F waves slightly prolonged for both median and ulnar nerves bilaterally.     Insertional activity in the upper extremity revealing the following:  In the abductor pollicis brevis occasional positive wave serrated potentials, polyphasic units, increased amplitude and diminished number found bilaterally. In the first dorsal interosseous occasional positive waves, fibrillation potentials, serrated potentials, polyphasic units, with marked increased amplitude and duration changes with diminished number. Similar changes however more severe were found in the abductor digiti quinti bilaterally.     In the lower cervical paraspinals serrated potentials, polyphasic units, increased amplitude and duration noted, no myotonic discharges or fibrillation potentials were noted. .. 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  The results were compared to the previous study of 2014 and demonstrated progressive delay in conduction across the carpal ligament. However, there was no significant change in amplitude velocity.     #2 ulnar stimulations demonstrated again prolongation in the latency as compared to the previous study of 2014 with no significant changes in velocities but reduction in amplitudes of the left ulnar nerve on today's study as compared to 2014.     #3 sensory latencies of both ulnar and median nerves have been more prolonged as compared to the previous study of September 2014     #4 insertional activity with evidence of pathic changes predominantly in the C 78 T1 areas bilaterally.     Recommendation: Please refer to the letter, discussion section for full details. In general, imaging is suggested of both neck and elbow areas and referral to hand surgeon was discussed with  Kashif Velez. I suggested Dr. Lacey Arana for assessment of his ulnar neuropathy.   We also discussed possibility of assessing his cervical stenosismyelopathy and recommendation of Dr. Kelly Schmidt.        Electronically signed by Alana Moseley MD on 7/3/8175 at 12:02 PM                   History:  Moni Caly has a long history dating back to  early 2010 of cervical dysfunction, as well as prominent syndromes involving both upper extremities. In December 2012 at [facility] he underwent his last cervical surgery insisting of and ACDF at C6-C7. The following year in 2013, he underwent bilateral carpal tunnel releases as well as cubital tunnel releases by Dr. Hieu Pugh. He relates that this time he has had progressive \"tingling and pain in my forearms and outer fingers of both hands\" particularly noted in the past 6 months. 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 Is also been placed on pregabalin by his primary care provider earlier this year with some assistance in the sensory abnormalities. He denies any bowel or bladder dysfunction, difficulty with ambulation and frequent falls. He does note however weakness in  bilaterally, and sensory abnormalities in both hands. .     ROS:   Please see the above history. He denies any endocrine issues, lung or cardiac abnormalities. Furthermore he denies any gastrointestinal difficulties. Further review of system is negative other than generalized age-related arthritis. Meds:    Prior to Admission medications    Medication Sig Start Date End Date Taking? Authorizing Provider   pregabalin (LYRICA) 150 MG capsule Take 1 capsule by mouth 2 times daily for 60 doses.  4/1/21 5/1/21  Halle Chicas, DO   sildenafil (VIAGRA) 50 MG tablet Take 0.5 tablets by mouth daily as needed for Erectile Dysfunction  Patient not taking: Reported on 4/1/2021 12/1/20   Enmanuel Haley, DO   VIAGRA 25 MG tablet Take 1 tablet by mouth as needed for Erectile Dysfunction 12/1/20   Halle Chicas, DO   pregabalin (LYRICA) 50 MG capsule  7/30/20   Historical Provider, MD   Melatonin 5 MG CAPS Take 1 tablet by mouth nightly    Historical Provider, MD   NONFORMULARY Tumeric and clogen    Historical Provider, MD   vitamin B-1 (THIAMINE) 100 MG tablet Take 100 mg by mouth daily LD 12-22-17    Historical Provider, MD   vitamin E 1000 units capsule Take by mouth daily  12-22-17    Historical Provider, MD   Omega-3 Fatty Acids (FISH OIL) 1000 MG CAPS Take 1,000 mg by mouth 3 times daily LD 12/22/17    Historical Provider, MD       PMH:     Past Medical History:   Diagnosis Date    Insomnia     problems sleeping through the night     Psychiatric problem        PSH:    Past Surgical History:   Procedure Laterality Date    CARPAL TUNNEL RELEASE      bilat hands     CERVICAL FUSION  2011    C6 & C7    SEPTOPLASTY  12/26/2017    FESS    TONSILLECTOMY         Social History:    Social History     Socioeconomic History    Marital status:      Spouse name: Not on file    Number of children: Not on file    Years of education: Not on file    Highest education level: Not on file   Occupational History    Not on file   Social Needs    Financial resource strain: Not hard at all   [employer] insecurity     Worry: Never true     Inability: Never true   UB. needs     Medical: No     Non-medical: No   Tobacco Use    Smoking status: Former Smoker     Packs/day: 0.25     Years: 3.00     Pack years: 0.75     Quit date: 10/8/2010     Years since quitting: 10.4    Smokeless tobacco: Never Used   Substance and Sexual Activity    Alcohol use:  Yes     Alcohol/week: 6.0 standard drinks     Types: 6 Cans of beer per week     Comment: 6 pack of beer weekly     Drug use: No    Sexual activity: Not on file   Lifestyle    Physical activity     Days per week: Not on file     Minutes per session: Not on file    Stress: Not on file   Relationships    Social connections     Talks on phone: Not on file     Gets together: Not on file     Attends [employer] service: Not on file     Active member of club or organization: Not on file     Attends meetings of clubs or organizations: Not on file     Relationship status: Not on file    Intimate partner violence     Fear of current or ex partner: Not on file     Emotionally abused: Not on file Physically abused: Not on file     Forced sexual activity: Not on file   Other Topics Concern    Not on file   Social History Narrative    Not on file       Family History:  No family history on file. Exam: Reveals a 68-year-old male 5 foot 8 inches weighing 160 pounds. Cognitively intact and following commands. Skin: Skin in the upper extremities is normal in color, temperature, and texture. No lesions are noted. Motor examination reveals atrophy in the thenar and hyperthenar eminences. Weakness is present in the hand intrinsics bilaterally, predominantly in the abductor digiti quinti. Opponens is present,  is diminished. Proximal strength is felt to be within normal limits in the upper extremities. Tone is felt to be normal in the upper extremities. .    Sensory examination is intact to pinprick. .     Reflexes are symmetrical in the upper extremities. No pathologic reflexes are noted. .     Cervical range of motion is full flexion, 40 degrees of extension, lateral left rotation 45 degrees and right lateral rotation 50 degrees. No pain is noted on motion. Discussion:    Please refer to the results of the electrodiagnostic examination. There is notable abnormality in conduction of the median nerve and the ulnar nerve bilaterally in the upper extremities at the wrist as well as elbow regions. The results were compared to previous study performed in 2014 and demonstrate some progression. However, I suspect that there was some permanency to the injury of both nerves bilaterally prior to the decompressive procedures performed in 2013. I am recommending referral to a hand surgeon, and Dr. Vishnu Ambrosio was discussed. I am referring Marium January back to his primary care provider to further discuss referral and evaluation by a hand surgeon particularly in regards to the ulnar nerves.   Mild progression of changes on today's examination of the median nerve, are felt to be minimal, and conservative management should be considered. However, at the present time, C8-T1 fibers appear to be causing more of a difficulty for Elijah. In addition, I also feel reevaluation of the cervical area is warranted. MRI [facility] was discussed) of the cervical area certainly should be considered. This is a complicated case and coordination and discussion with multiple specialists needs to occur in order to render a treatment management plan that will be successful in modifying his present symptoms. If there are any questions, please contact me for discussion. Thank you once again for allowing me to participate along with you in his behalf.             Electronically signed by Ulysses Fudge, MD on 2/5/3755 at 12:36 PM

## 2021-04-05 NOTE — LETTER
RE:  Valentin Kevin    Dear Dr. Helene Mcbride,     Enclosed you will find a copy of the requested EMG on your patient, Valentin Brown completed 4/5/2021. EMG Findings:     Kevin Andre MD   Physician   Internal Medicine   Procedures   Signed   Date of Service:  4/5/2021  9:00 AM            Procedure Orders   EMG [[id number]] ordered by  at 04/01/21 1034   Pre-procedure Diagnoses   Bilateral neuropathy of upper extremities [G56.93]   Procedures   EMG [NEU5]          Signed             Show:Clear all  [x]Manual[x]Template[x]Copied    Added by:  Aníbal Hayward MD    []Luigiver for details   [address] [place] [address]  Electrodiagnostic Laboratory  [place]         Full Name:    Valentin Brown                    Gender:             Male  MRN:             [id number]                              YOB: 1950  Location[de-identified]     [facility]      Visit Date:                          4/5/2021 08:14  Age:                                   79 Years 6 Months Old  Examining Physician:      Dr. Hough Camera   Referring Physician:        Dr. Heath Mckinney  Technician:                       Dav Vazquez   Height:                               5 feet 8 inch  Weight:                              160 lbs  Notes:                                Neuropathy of uppers      Motor NCS      Nerve / Sites Lat. Amplitude Amp. 1-2 Distance Lat Diff Velocity Temp.     ms mV % cm ms m/s °C   R Median - APB      Wrist 5.16 10.9 100 8     32      Elbow 9.95 10.5 95.6 22 4.79 46 32   L Median - APB      Wrist 5.52 7.8 100 8     32      Elbow 10.10 7.5 96.6 21 4.58 46 32   R Ulnar - ADM      Wrist 5.73 7.1 100 8     32      B. Elbow 8.54 4.2 59.8 19 2.81 68 32      A. Elbow 11.51 3.9 54.9 10 2.97 34 32   L Ulnar - ADM      Wrist 4.22 7.6 100 8     32.1      B. Elbow 8.54 6.4 85.3 19 4.32 44 32.1      A. Elbow 11.51 6.3 82.9 10 2.97 34 32.1       Sensory NCS      Nerve / Sites Onset Lat Peak Lat PP Amp Distance Velocity Temp.     ms ms µV cm m/s °C   R Median - Digit II (Antidromic)      Mid Palm 1.56 2.55 13.5 7 45 32.1      Wrist 2.92 4.48 11.2 14 48 32.1   L Median - Digit II (Antidromic)      Mid Palm 1.72 2.45 18.5 7 41 32.1      Wrist 3.44 4.38 16.9 14 41 32   R Ulnar - Digit V (Antidromic)      Wrist 5.63 7.76 71.5 14 25 32   L Ulnar - Digit V (Antidromic)      Wrist 3.80 4.90 17.4 14 37 32.2   R Radial - Anatomical snuff box (Forearm)      Forearm 2.19 3.02 9.5 10 46 32   L Radial - Anatomical snuff box (Forearm)      Forearm 2.14 2.92 10.4 10 47 32.1   R Dorsal ulnar cutaneous - Hand dorsum (Forearm)      Forearm NR NR NR 8 NR 32       F  Wave      Nerve F Lat M Lat F-M Lat     ms ms ms   R Median - APB 34.3 2.6 31.7   R Ulnar - ADM 33.6 5.2 28.4   L Median - APB 33.1 4.9 28.2   L Ulnar - ADM 36.6 4.4 32.2       EMG                     EMG Summary Table       Spontaneous MUAP Recruitment   Muscle IA Fib PSW Fasc H.F. Amp Dur. PPP Pattern   L. Cervical paraspinals (low) Incr None None None 2+ Serrated 2+ 2+ 2+ N   R. Cervical paraspinals (low) Incr None None None 2+ Serrated 2+ 2+ 2+ N   L. Deltoid Incr None None None None N N 1+ Inc #   L. Triceps brachii N None None None None N N N N   R. Triceps brachii N None None None None N N N N   R. Biceps brachii N None None None 1+ Serrated N N 2+ Inc #   R. Brachioradialis N None None None 1+ Serrated N 1+ 1+ N   L. Flexor carpi ulnaris Incr None None None 2+ Serrated N 1+ 2+ N   R. Flexor carpi ulnaris Incr None None None 2+ Serrated N 1+ 2+ N   L. Flexor pollicis longus N None None None None N N N N   R. Flexor pollicis longus N None None None None N N N N   R. & L Abductor pollicis brevis Incr None Occasional None 1+ Serrated 1+ N 2+ Sl Decr #   L. First dorsal interosseous N Occasional 1+ None 1+ Serrated 2+ 2+ 2+ Decr #   R. First dorsal interosseous Incr None Occasional None 2+ Serrated 2+ 2+ 2+ Decr #   L.  Abductor digiti minimi (pedis) Incr 2+ 3+ None 3+ Serrated wave serrated potentials, polyphasic units, increased amplitude and diminished number found bilaterally. In the first dorsal interosseous occasional positive waves, fibrillation potentials, serrated potentials, polyphasic units, with marked increased amplitude and duration changes with diminished number. Similar changes however more severe were found in the abductor digiti quinti bilaterally.     In the lower cervical paraspinals serrated potentials, polyphasic units, increased amplitude and duration noted, no myotonic discharges or fibrillation potentials were noted. .. 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  The results were compared to the previous study of 2014 and demonstrated progressive delay in conduction across the carpal ligament. However, there was no significant change in amplitude velocity.     #2 ulnar stimulations demonstrated again prolongation in the latency as compared to the previous study of 2014 with no significant changes in velocities but reduction in amplitudes of the left ulnar nerve on today's study as compared to 2014.     #3 sensory latencies of both ulnar and median nerves have been more prolonged as compared to the previous study of September 2014     #4 insertional activity with evidence of pathic changes predominantly in the C 78 T1 areas bilaterally.     Recommendation: Please refer to the letter, discussion section for full details. In general, imaging is suggested of both neck and elbow areas and referral to hand surgeon was discussed with Mr. Jorge Pelayo. I suggested Dr. Rukhsana Patel for assessment of his ulnar neuropathy.   We also discussed possibility of assessing his cervical stenosismyelopathy and recommendation of Dr. Amaya Goodman.        Electronically signed by Morgan Crandall MD on 9/8/7709 at 12:02 PM                   History:  Dontrell Manning ***.     ROS:   ***    Meds:    Prior to Admission medications Medication Sig Start Date End Date Taking? Authorizing Provider   pregabalin (LYRICA) 150 MG capsule Take 1 capsule by mouth 2 times daily for 60 doses.  4/1/21 5/1/21  Brooklynn Mendoza,    sildenafil (VIAGRA) 50 MG tablet Take 0.5 tablets by mouth daily as needed for Erectile Dysfunction  Patient not taking: Reported on 4/1/2021 12/1/20   Marylen Amato Rudnicki, DO   VIAGRA 25 MG tablet Take 1 tablet by mouth as needed for Erectile Dysfunction 12/1/20   Brooklynn Mendoza DO   pregabalin (LYRICA) 50 MG capsule  7/30/20   Historical Provider, MD   Melatonin 5 MG CAPS Take 1 tablet by mouth nightly    Historical Provider, MD   NONFORMULARY Tumeric and clogen    Historical Provider, MD   vitamin B-1 (THIAMINE) 100 MG tablet Take 100 mg by mouth daily  12-22-17    Historical Provider, MD   vitamin E 1000 units capsule Take by mouth daily  12-22-17    Historical Provider, MD   Omega-3 Fatty Acids (FISH OIL) 1000 MG CAPS Take 1,000 mg by mouth 3 times daily LD 12/22/17    Historical Provider, MD       PMH:     Past Medical History:   Diagnosis Date    Insomnia     problems sleeping through the night     Psychiatric problem        PSH:    Past Surgical History:   Procedure Laterality Date    CARPAL TUNNEL RELEASE      bilat hands     CERVICAL FUSION  2011    C6 & C7    SEPTOPLASTY  12/26/2017    FESS    TONSILLECTOMY         Social History:    Social History     Socioeconomic History    Marital status:      Spouse name: Not on file    Number of children: Not on file    Years of education: Not on file    Highest education level: Not on file   Occupational History    Not on file   Social Needs    Financial resource strain: Not hard at all   T-VIPS-Mendez insecurity     Worry: Never true     Inability: Never true    Transportation needs     Medical: No     Non-medical: No   Tobacco Use    Smoking status: Former Smoker     Packs/day: 0.25     Years: 3.00     Pack years: 0.75     Quit date: 10/8/2010     Years since quitting: 10.4    Smokeless tobacco: Never Used   Substance and Sexual Activity    Alcohol use: Yes     Alcohol/week: 6.0 standard drinks     Types: 6 Cans of beer per week     Comment: 6 pack of beer weekly     Drug use: No    Sexual activity: Not on file   Lifestyle    Physical activity     Days per week: Not on file     Minutes per session: Not on file    Stress: Not on file   Relationships    Social connections     Talks on phone: Not on file     Gets together: Not on file     Attends Faith service: Not on file     Active member of club or organization: Not on file     Attends meetings of clubs or organizations: Not on file     Relationship status: Not on file    Intimate partner violence     Fear of current or ex partner: Not on file     Emotionally abused: Not on file     Physically abused: Not on file     Forced sexual activity: Not on file   Other Topics Concern    Not on file   Social History Narrative    Not on file       Family History:  No family history on file. Exam:  ***    Skin:  ***    Motor examination ***. Sensory examination ***. Reflexes ***.     ***    If there are any questions, please contact me for discussion. Thank you once again for allowing me to participate along with you in his behalf.             Electronically signed by Lolis Corey MD on 2/8/1926 at 12:15 PM***

## 2021-04-07 ENCOUNTER — OFFICE VISIT (OUTPATIENT)
Dept: ENT CLINIC | Age: 71
End: 2021-04-07
Payer: MEDICARE

## 2021-04-07 ENCOUNTER — PROCEDURE VISIT (OUTPATIENT)
Dept: AUDIOLOGY | Age: 71
End: 2021-04-07
Payer: MEDICARE

## 2021-04-07 VITALS
SYSTOLIC BLOOD PRESSURE: 131 MMHG | HEART RATE: 63 BPM | DIASTOLIC BLOOD PRESSURE: 85 MMHG | HEIGHT: 69 IN | WEIGHT: 163 LBS | BODY MASS INDEX: 24.14 KG/M2

## 2021-04-07 DIAGNOSIS — H90.3 SENSORY HEARING LOSS, BILATERAL: Primary | ICD-10-CM

## 2021-04-07 DIAGNOSIS — H93.13 TINNITUS OF BOTH EARS: ICD-10-CM

## 2021-04-07 DIAGNOSIS — H93.13 TINNITUS, BILATERAL: ICD-10-CM

## 2021-04-07 DIAGNOSIS — H90.3 SENSORINEURAL HEARING LOSS, BILATERAL: ICD-10-CM

## 2021-04-07 PROCEDURE — 99203 OFFICE O/P NEW LOW 30 MIN: CPT | Performed by: OTOLARYNGOLOGY

## 2021-04-07 PROCEDURE — 4040F PNEUMOC VAC/ADMIN/RCVD: CPT | Performed by: OTOLARYNGOLOGY

## 2021-04-07 PROCEDURE — 1123F ACP DISCUSS/DSCN MKR DOCD: CPT | Performed by: OTOLARYNGOLOGY

## 2021-04-07 PROCEDURE — 3017F COLORECTAL CA SCREEN DOC REV: CPT | Performed by: OTOLARYNGOLOGY

## 2021-04-07 PROCEDURE — G8427 DOCREV CUR MEDS BY ELIG CLIN: HCPCS | Performed by: OTOLARYNGOLOGY

## 2021-04-07 PROCEDURE — 1036F TOBACCO NON-USER: CPT | Performed by: OTOLARYNGOLOGY

## 2021-04-07 PROCEDURE — G8420 CALC BMI NORM PARAMETERS: HCPCS | Performed by: OTOLARYNGOLOGY

## 2021-04-07 PROCEDURE — 92557 COMPREHENSIVE HEARING TEST: CPT | Performed by: AUDIOLOGIST

## 2021-04-07 PROCEDURE — 92567 TYMPANOMETRY: CPT | Performed by: AUDIOLOGIST

## 2021-04-07 NOTE — PROGRESS NOTES
This patient was referred for audiometric/tympanometric testing by Dr. Josh Hernandez due to worsening tinnitus and decrease in hearing sensitivity, for the past 3 months, bilaterally. Patient denied earaches/pain/pressure, bilaterally. Audiometry revealed a mild-to-moderately-severe sensorineural hearing loss, through the frequency range, bilaterally. Reliability was good. Speech reception thresholds were in good agreement with the pure tone averages, bilaterally. Speech discrimination scores were 92%, right ear and 96%, left ear at 60-65dBHL. Tympanometry revealed normal middle ear peak pressure and compliance, bilaterally. Ipsilateral acoustic reflexes were absent, right ear and present, left ear at 1000Hz. The results were reviewed with the patient. The benefits and limitations of amplification were discussed with the patient. It was recommended he be fit with binaural hearing aids. Patient is interested in trying hearing aids and they were ordered for the patient. Patient is scheduled to return on 4/22/2021, for a hearing aid fitting. Recommendations for follow up will be made pending physician consult.     Claudean Fey, CCC/MARISA  Audiologist  [id number]  NPI#:  [id number]

## 2021-04-22 ENCOUNTER — PROCEDURE VISIT (OUTPATIENT)
Dept: AUDIOLOGY | Age: 71
End: 2021-04-22

## 2021-04-22 DIAGNOSIS — H90.3 SENSORINEURAL HEARING LOSS, BILATERAL: ICD-10-CM

## 2021-04-22 PROCEDURE — 99999 PR OFFICE/OUTPT VISIT,PROCEDURE ONLY: CPT | Performed by: AUDIOLOGIST

## 2021-04-22 NOTE — PROGRESS NOTES
Patient was seen for a hearing aid fitting. Patient and his wife were instructed in the use and care of the hearing aids/. Patient was able to insert and remove hearing aids with minimal difficulty. Patient's phone and hearing aids were synced and patient was able to adjust hearing aids, with his phone. Patient is scheduled to return in one week for a hearing aid check.

## 2021-04-24 ASSESSMENT — ENCOUNTER SYMPTOMS
SINUS PRESSURE: 0
COUGH: 0
SINUS PAIN: 0
SHORTNESS OF BREATH: 0
VOMITING: 0

## 2021-04-24 NOTE — PROGRESS NOTES
[facility] Otolaryngology  Dr. Donna Perez. KINSEY Mccabe Ms.Ed. New Consult       Patient Name:  Marjorie Guerrier  :  1950     CHIEF C/O:    Chief Complaint   Patient presents with    New Patient     patient here for hearing loss, ringing in both ears and cerumen    Ear Problem    Cerumen Impaction       HISTORY OBTAINED FROM:  patient    HISTORY OF PRESENT ILLNESS:       Kristina Correia is a 79y.o. year old male, here today for relation of hearing loss. Patient states been getting aggressive warm difficulty with hearing tickly with TV as well as in background noises. No current complaints of vertigo, no complaints of new tinnitus but does complain of bilateral tinnitus. No history of vertigo. No current complaints of ear pain. No prior history of ear surgeries. No other complaints today of chronic rhinitis congestion nasal drainage or postnasal drip. Past Medical History:   Diagnosis Date    Insomnia     problems sleeping through the night     Psychiatric problem      Past Surgical History:   Procedure Laterality Date    CARPAL TUNNEL RELEASE      bilat hands     CERVICAL FUSION      C6 & C7    SEPTOPLASTY  2017    FESS    TONSILLECTOMY         Current Outpatient Medications:     pregabalin (LYRICA) 150 MG capsule, Take 1 capsule by mouth 2 times daily for 60 doses. , Disp: 60 capsule, Rfl: 3    VIAGRA 25 MG tablet, Take 1 tablet by mouth as needed for Erectile Dysfunction, Disp: 30 tablet, Rfl: 1    Melatonin 5 MG CAPS, Take 1 tablet by mouth nightly, Disp: , Rfl:     NONFORMULARY, Tumeric and clogen, Disp: , Rfl:     vitamin B-1 (THIAMINE) 100 MG tablet, Take 100 mg by mouth daily LD 17, Disp: , Rfl:     vitamin E 1000 units capsule, Take by mouth daily LD 17, Disp: , Rfl:     Omega-3 Fatty Acids (FISH OIL) 1000 MG CAPS, Take 1,000 mg by mouth 3 times daily LD 17, Disp: , Rfl:   Patient has no known allergies.   Social History     Tobacco Use    Smoking status: Former Smoker     Packs/day: 0.25     Years: 3.00     Pack years: 0.75     Quit date: 10/8/2010     Years since quitting: 10.5    Smokeless tobacco: Never Used   Substance Use Topics    Alcohol use: Yes     Alcohol/week: 6.0 standard drinks     Types: 6 Cans of beer per week     Comment: 6 pack of beer weekly     Drug use: No     History reviewed. No pertinent family history. Review of Systems   Constitutional: Negative for chills and fever. HENT: Positive for hearing loss. Negative for congestion, ear discharge, nosebleeds, sinus pressure, sinus pain and tinnitus. Respiratory: Negative for cough and shortness of breath. Cardiovascular: Negative for chest pain and palpitations. Gastrointestinal: Negative for vomiting. Skin: Negative for rash. Allergic/Immunologic: Negative for environmental allergies. Neurological: Negative for dizziness and headaches. Hematological: Does not bruise/bleed easily. All other systems reviewed and are negative. /85   Pulse 63   Ht 5' 9\" (1.753 m)   Wt 163 lb (73.9 kg)   BMI 24.07 kg/m²   Physical Exam          IMPRESSION/PLAN:  Patient seen and examined, bilateral moderate sensorineural hearing loss without any associated signs or symptoms considered for inner ear pathology. Recommendation with patient to consider possible hearing aids, hearing protection reviewed, and follow-up in 1 year with repeat audiogram.    Dr. Mary Levin Otolaryngology/Facial Plastic Surgery Residency  Associate Clinical Professor:  [facility]

## 2021-04-29 ENCOUNTER — PROCEDURE VISIT (OUTPATIENT)
Dept: AUDIOLOGY | Age: 71
End: 2021-04-29

## 2021-04-29 DIAGNOSIS — H90.3 SENSORINEURAL HEARING LOSS, BILATERAL: ICD-10-CM

## 2021-04-29 PROCEDURE — 99999 PR OFFICE/OUTPT VISIT,PROCEDURE ONLY: CPT | Performed by: AUDIOLOGIST

## 2021-04-29 NOTE — PROGRESS NOTES
Patient seen for hearing aid check. Numerous questions answered yesterday. Patient was instructed in the changing of the domes and wax guards. Patient is scheduled to return for a 30-day hearing aid check.

## 2021-06-01 ENCOUNTER — TELEPHONE (OUTPATIENT)
Dept: ORTHOPEDIC SURGERY | Age: 71
End: 2021-06-01

## 2021-06-01 DIAGNOSIS — R20.2 NUMBNESS AND TINGLING IN BOTH HANDS: Primary | ICD-10-CM

## 2021-06-01 DIAGNOSIS — R20.0 NUMBNESS AND TINGLING IN BOTH HANDS: Primary | ICD-10-CM

## 2021-06-03 ENCOUNTER — OFFICE VISIT (OUTPATIENT)
Dept: ORTHOPEDIC SURGERY | Age: 71
End: 2021-06-03
Payer: MEDICARE

## 2021-06-03 VITALS — BODY MASS INDEX: 23.7 KG/M2 | RESPIRATION RATE: 18 BRPM | WEIGHT: 160 LBS | HEIGHT: 69 IN

## 2021-06-03 DIAGNOSIS — R20.2 NUMBNESS AND TINGLING IN BOTH HANDS: Primary | ICD-10-CM

## 2021-06-03 DIAGNOSIS — R20.0 NUMBNESS AND TINGLING IN BOTH HANDS: Primary | ICD-10-CM

## 2021-06-03 PROCEDURE — 1123F ACP DISCUSS/DSCN MKR DOCD: CPT | Performed by: ORTHOPAEDIC SURGERY

## 2021-06-03 PROCEDURE — G8427 DOCREV CUR MEDS BY ELIG CLIN: HCPCS | Performed by: ORTHOPAEDIC SURGERY

## 2021-06-03 PROCEDURE — G8420 CALC BMI NORM PARAMETERS: HCPCS | Performed by: ORTHOPAEDIC SURGERY

## 2021-06-03 PROCEDURE — 99204 OFFICE O/P NEW MOD 45 MIN: CPT | Performed by: ORTHOPAEDIC SURGERY

## 2021-06-03 PROCEDURE — 4040F PNEUMOC VAC/ADMIN/RCVD: CPT | Performed by: ORTHOPAEDIC SURGERY

## 2021-06-03 PROCEDURE — 3017F COLORECTAL CA SCREEN DOC REV: CPT | Performed by: ORTHOPAEDIC SURGERY

## 2021-06-03 PROCEDURE — 1036F TOBACCO NON-USER: CPT | Performed by: ORTHOPAEDIC SURGERY

## 2021-06-03 RX ORDER — M-VIT,TX,IRON,MINS/CALC/FOLIC 27MG-0.4MG
1 TABLET ORAL DAILY
COMMUNITY

## 2021-06-03 NOTE — PROGRESS NOTES
Department of Orthopedic Surgery  History and Physical      CHIEF COMPLAINT: Bilateral hand numbness and tingling    HISTORY OF PRESENT ILLNESS:                The patient is a 70 y.o. male who presents with bilateral hand numbness and tingling. Patient originally had cubital tunnel release and carpal tunnel release bilaterally back in 2013 by Dr. Chantale Owens, around the time patient also had an ACDF of C 67 around the same time. After the surgery patient states he had almost no relief although he is continue to have numbness and tingling in bilateral hands. Recently had an EMG performed. Major complaint is weakness of the hands, most specifically when opening jars and anything involving  strength. Patient right-hand-dominant. Recently retired. Other orthopedic points this time. Saw a spine surgeon who stated that repeat procedure on the neck would likely not benefit. Patient does not want to proceed with any elective procedure. Patient had an EMG/NCS dated 4/5/21    Right median nerve motor latency: 5.16   Left median nerve motor latency: 5.52  Right ulnar nerve velocity: 34 (previously 32 9/22/14)  Left ulnar nerve velocity: 34 (previously 35 9/22/14)      EMG portion of the exam demonstrates continued ulnar neuropathy no significant change in velocities from previous studies    Past Medical History:        Diagnosis Date    Insomnia     problems sleeping through the night     Psychiatric problem      Past Surgical History:        Procedure Laterality Date    CARPAL TUNNEL RELEASE  2013    bilat hands     CERVICAL FUSION  2011    C6 & C7    SEPTOPLASTY  12/26/2017    FESS    TONSILLECTOMY       Current Medications:   No current facility-administered medications for this visit. Allergies:  Patient has no known allergies. Social History:   TOBACCO:   reports that he quit smoking about 10 years ago. He has a 0.75 pack-year smoking history.  He has never used smokeless tobacco.  ETOH: reports current alcohol use of about 6.0 standard drinks of alcohol per week. DRUGS:   reports no history of drug use. ACTIVITIES OF DAILY LIVING:    OCCUPATION:    Family History:   No family history on file. REVIEW OF SYSTEMS:  CONSTITUTIONAL:  negative  EYES:  negative  RESPIRATORY:  negative  CARDIOVASCULAR:  negative  GASTROINTESTINAL:  negative  HEMATOLOGIC/LYMPHATIC:  negative  ALLERGIC/IMMUNOLOGIC:  negative  ENDOCRINE:  negative  MUSCULOSKELETAL:  positive for  pain  NEUROLOGICAL: Positive for numbness and tingling    PHYSICAL EXAM:    VITALS:  Resp 18   Ht 5' 9\" (1.753 m)   Wt 160 lb (72.6 kg)   BMI 23.63 kg/m²   CONSTITUTIONAL:  awake, alert, cooperative, no apparent distress, and appears stated age  EYES:  Lids and lashes normal, pupils equal, round and reactive to light, extra ocular muscles intact, sclera clear, conjunctiva normal  ENT:  Normocephalic, without obvious abnormality, atraumatic, sinuses nontender on palpation, external ears without lesions, oral pharynx with moist mucus membranes, tonsils without erythema or exudates, gums normal and good dentition. NECK:  Supple, symmetrical, trachea midline, no adenopathy, thyroid symmetric, not enlarged and no tenderness, skin normal  LUNGS:  CTA  CARDIOVASCULAR:  2+ radial pulses, extremities warm and well perfused  ABDOMEN:   NTTP  CHEST:  Atraumatic   GENITAL/URINARY:  deferred  NEUROLOGIC:  Awake, alert, oriented to name, place and time. Cranial nerves II-XII are grossly intact. Motor is 5 out of 5 bilaterally. Sensory is intact.  gait is normal.  MUSCULOSKELETAL:    Right upper extremity  Non-tender about the shoulder and elbow with good ROM. Previous surgical incision well-healed. + tinels of the cubital tunnel, - tinels of the carpal tunnel, - durkans, - finkelsteins, - CMC grind, - tenderness over the A1 pulleys with no active triggering. Full flexion and extension of the fingers.  - wartenbergs and cross finger testing, APB not limited to death or complication from anesthesia, continued pain, nerve tendon or vascular injury, infection, hematoma, deep vein thrombosis or pulmonary embolism, and need for further surgery, etc. Patient understood this, asked appropriate questions, which were all answered, and elected to proceed with the procedure. I have seen and evaluated the patient and agree with the above assessment and plan on today's visit. I have performed the key components of the history and physical examination with significant findings of complex history with continued numbness and tingling status post bilateral submuscular ulnar nerve decompressions with no significant improvement and nerve conduction velocities when compared to 2014 nerve test.  Complexity of his ongoing symptoms were explained. Discussed revision surgery and increasing level of complexity given his previous submuscular transposition and continued symptomatology. He is also having EMG changes and intrinsic changes. Discussed supercharged and side nerve transfer with AIN to preserve motor function with concurrent revision decompression at elbow. Discussed that he may not have any significant improvement. Discussed continued atrophy and weakness of his hands. All questions answered. . I concur with the findings and plan as documented. I explained the risks, benefits, alternatives and complications of surgery with the patient including but not limited to the risks of infection, possible damage to nerves, vessels, or tendons, stiffness, loss of range of motion, scar sensitivity, wound healing complications, worsening symptoms, possible need for therapy, as well as the possible need further surgery and unanticipated complications. The patient voiced understanding and all questions were answered. The patient elected to proceed with surgical intervention.      Yomi Melendez MD  6/3/2021

## 2021-06-04 DIAGNOSIS — G56.93 BILATERAL NEUROPATHY OF UPPER EXTREMITIES: Primary | ICD-10-CM

## 2021-06-04 RX ORDER — PREGABALIN 200 MG/1
200 CAPSULE ORAL 2 TIMES DAILY
Qty: 60 CAPSULE | Refills: 1 | Status: SHIPPED
Start: 2021-06-04 | End: 2021-08-03

## 2021-08-03 DIAGNOSIS — G56.93 BILATERAL NEUROPATHY OF UPPER EXTREMITIES: ICD-10-CM

## 2021-08-03 RX ORDER — PREGABALIN 200 MG/1
CAPSULE ORAL
Qty: 60 CAPSULE | Refills: 0 | Status: SHIPPED
Start: 2021-08-03 | End: 2021-08-26 | Stop reason: SDUPTHER

## 2021-08-26 DIAGNOSIS — G56.93 BILATERAL NEUROPATHY OF UPPER EXTREMITIES: ICD-10-CM

## 2021-08-30 ENCOUNTER — PREP FOR PROCEDURE (OUTPATIENT)
Dept: ORTHOPEDIC SURGERY | Age: 71
End: 2021-08-30

## 2021-08-30 RX ORDER — SODIUM CHLORIDE 0.9 % (FLUSH) 0.9 %
5-40 SYRINGE (ML) INJECTION PRN
Status: CANCELLED | OUTPATIENT
Start: 2021-08-30

## 2021-08-30 RX ORDER — PREGABALIN 200 MG/1
200 CAPSULE ORAL 2 TIMES DAILY
Qty: 60 CAPSULE | Refills: 0 | Status: SHIPPED
Start: 2021-08-30 | End: 2021-09-21 | Stop reason: SDUPTHER

## 2021-08-30 RX ORDER — SODIUM CHLORIDE 0.9 % (FLUSH) 0.9 %
5-40 SYRINGE (ML) INJECTION EVERY 12 HOURS SCHEDULED
Status: CANCELLED | OUTPATIENT
Start: 2021-08-30

## 2021-08-30 RX ORDER — SODIUM CHLORIDE 9 MG/ML
INJECTION, SOLUTION INTRAVENOUS CONTINUOUS
Status: CANCELLED | OUTPATIENT
Start: 2021-08-30

## 2021-08-30 RX ORDER — SODIUM CHLORIDE 9 MG/ML
25 INJECTION, SOLUTION INTRAVENOUS PRN
Status: CANCELLED | OUTPATIENT
Start: 2021-08-30

## 2021-09-02 ENCOUNTER — HOSPITAL ENCOUNTER (OUTPATIENT)
Dept: PREADMISSION TESTING | Age: 71
Discharge: HOME | End: 2021-09-02
Payer: MEDICARE

## 2021-09-02 LAB
EKG ATRIAL RATE: 65 BPM
EKG P AXIS: 19 DEGREES
EKG P-R INTERVAL: 178 MS
EKG Q-T INTERVAL: 406 MS
EKG QRS DURATION: 86 MS
EKG QTC CALCULATION (BAZETT): 422 MS
EKG R AXIS: -18 DEGREES
EKG T AXIS: 24 DEGREES
EKG VENTRICULAR RATE: 65 BPM

## 2021-09-02 RX ORDER — DIAZEPAM 2 MG/1
2 TABLET ORAL NIGHTLY PRN
COMMUNITY
End: 2022-10-11 | Stop reason: SDUPTHER

## 2021-09-02 NOTE — PROGRESS NOTES
[facility] PRE-ADMISSION TESTING INSTRUCTIONS    The Preadmission Testing patient is instructed accordingly using the following criteria (check applicable):    ARRIVAL INSTRUCTIONS:  [x] Parking the day of Surgery is located in the Main Entrance lot. Upon entering the door, make an immediate right to the surgery reception desk    [x] Bring photo ID and insurance card    [] Bring in a copy of Living will or Durable Power of  papers. [x] Please be sure to arrange for responsible adult to provide transportation to and from the hospital    [x] Please arrange for responsible adult to be with you for the 24 hour period post procedure due to having anesthesia      GENERAL INSTRUCTIONS:    [x] Nothing by mouth after midnight, including gum, candy, mints or water    [x] You may brush your teeth, but do not swallow any water    [x] Take medications as instructed with 1-2 oz of water    [] Stop herbal supplements and vitamins 5 days prior to procedure    [] Follow preop dosing of blood thinners per physician instructions    [] Take 1/2 dose of evening insulin, but no insulin after midnight    [] No oral diabetic medications after midnight    [] If diabetic and have low blood sugar or feel symptomatic, take 1-2oz apple juice only    [] Bring inhalers day of surgery    [] Bring C-PAP/ Bi-Pap day of surgery    [] Bring urine specimen day of surgery    [x] Shower or bath with soap, lather and rinse well, AM of Surgery, no lotion, powders or creams to surgical site    [] Follow bowel prep as instructed per surgeon    [x] No tobacco products within 24 hours of surgery     [x] No alcohol or illegal drug use within 24 hours of surgery.     [x] Jewelry, body piercing's, eyeglasses, contact lenses and dentures are not permitted into surgery (bring cases)      [] Please do not wear any nail polish, make up or hair products on the day of surgery    [x] You can expect a call the business day prior to procedure to notify you if your arrival time changes    [x] If you receive a survey after surgery we would greatly appreciate your comments    [] Parent/guardian of a minor must accompany their child and remain on the premises  the entire time they are under our care     [] Pediatric patients may bring favorite toy, blanket or comfort item with them    [] A caregiver or family member must remain with the patient during their stay if they are mentally handicapped, have dementia, disoriented or unable to use a call light or would be a safety concern if left unattended    [x] Please notify surgeon if you develop any illness between now and time of surgery (cold, cough, sore throat, fever, nausea, vomiting) or any signs of infections  including skin, wounds, and dental.    [x]  The Outpatient Pharmacy is available to fill your prescription here on your day of surgery, ask your preop nurse for details    [] Other instructions    EDUCATIONAL MATERIALS PROVIDED:    [] PAT Preoperative Education Packet/Booklet     [] Medication List    [] Transfusion bracelet applied with instructions    [] Shower with soap, lather and rinse well, and use CHG wipes provided the evening before surgery as instructed    [] Incentive spirometer with instructions

## 2021-09-02 NOTE — PROGRESS NOTES
Have you been tested for COVID  Yes           Have you been told you were positive for COVID No  Have you had any known exposure to someone that is positive for COVID No  Do you have a cough                   No              Do you have shortness of breath No                 Do you have a sore throat            No                Are you having chills                    No                Are you having muscle aches. No                    Please come to the hospital wearing a mask and have your significant other wear a mask as well. Both of you should check your temperature before leaving to come here,  if it is 100 or higher please call [phone] for instruction. 90.7

## 2021-09-08 ENCOUNTER — HOSPITAL ENCOUNTER (OUTPATIENT)
Age: 71
Setting detail: OUTPATIENT SURGERY
Discharge: HOME | End: 2021-09-08
Attending: ORTHOPAEDIC SURGERY | Admitting: ORTHOPAEDIC SURGERY
Payer: MEDICARE

## 2021-09-08 ENCOUNTER — ANESTHESIA EVENT (OUTPATIENT)
Dept: OPERATING ROOM | Age: 71
End: 2021-09-08
Payer: MEDICARE

## 2021-09-08 ENCOUNTER — ANESTHESIA (OUTPATIENT)
Dept: OPERATING ROOM | Age: 71
End: 2021-09-08
Payer: MEDICARE

## 2021-09-08 VITALS
TEMPERATURE: 96.8 F | OXYGEN SATURATION: 96 % | HEART RATE: 86 BPM | HEIGHT: 69 IN | DIASTOLIC BLOOD PRESSURE: 79 MMHG | RESPIRATION RATE: 19 BRPM | SYSTOLIC BLOOD PRESSURE: 126 MMHG | WEIGHT: 150 LBS | BODY MASS INDEX: 22.22 KG/M2

## 2021-09-08 VITALS
TEMPERATURE: 93.4 F | OXYGEN SATURATION: 99 % | RESPIRATION RATE: 3 BRPM | DIASTOLIC BLOOD PRESSURE: 56 MMHG | SYSTOLIC BLOOD PRESSURE: 113 MMHG

## 2021-09-08 DIAGNOSIS — G89.18 POST-OPERATIVE PAIN: Primary | ICD-10-CM

## 2021-09-08 PROCEDURE — 2500000003 HC RX 250 WO HCPCS: Performed by: ORTHOPAEDIC SURGERY

## 2021-09-08 PROCEDURE — 7100000010 HC PHASE II RECOVERY - FIRST 15 MIN: Performed by: ORTHOPAEDIC SURGERY

## 2021-09-08 PROCEDURE — 2580000003 HC RX 258: Performed by: PHYSICIAN ASSISTANT

## 2021-09-08 PROCEDURE — 64708 REVISE ARM/LEG NERVE: CPT | Performed by: ORTHOPAEDIC SURGERY

## 2021-09-08 PROCEDURE — 6360000002 HC RX W HCPCS: Performed by: NURSE ANESTHETIST, CERTIFIED REGISTERED

## 2021-09-08 PROCEDURE — 7100000011 HC PHASE II RECOVERY - ADDTL 15 MIN: Performed by: ORTHOPAEDIC SURGERY

## 2021-09-08 PROCEDURE — 2580000003 HC RX 258: Performed by: NURSE ANESTHETIST, CERTIFIED REGISTERED

## 2021-09-08 PROCEDURE — 2720000010 HC SURG SUPPLY STERILE: Performed by: ORTHOPAEDIC SURGERY

## 2021-09-08 PROCEDURE — 3700000000 HC ANESTHESIA ATTENDED CARE: Performed by: ORTHOPAEDIC SURGERY

## 2021-09-08 PROCEDURE — 64836 REPAIR OF HAND OR FOOT NERVE: CPT | Performed by: ORTHOPAEDIC SURGERY

## 2021-09-08 PROCEDURE — 2500000003 HC RX 250 WO HCPCS: Performed by: NURSE ANESTHETIST, CERTIFIED REGISTERED

## 2021-09-08 PROCEDURE — 64718 REVISE ULNAR NERVE AT ELBOW: CPT | Performed by: ORTHOPAEDIC SURGERY

## 2021-09-08 PROCEDURE — 64772 INCISION OF SPINAL NERVE: CPT | Performed by: ORTHOPAEDIC SURGERY

## 2021-09-08 PROCEDURE — 7100000001 HC PACU RECOVERY - ADDTL 15 MIN: Performed by: ORTHOPAEDIC SURGERY

## 2021-09-08 PROCEDURE — 6360000002 HC RX W HCPCS: Performed by: PHYSICIAN ASSISTANT

## 2021-09-08 PROCEDURE — 3700000001 HC ADD 15 MINUTES (ANESTHESIA): Performed by: ORTHOPAEDIC SURGERY

## 2021-09-08 PROCEDURE — 2709999900 HC NON-CHARGEABLE SUPPLY: Performed by: ORTHOPAEDIC SURGERY

## 2021-09-08 PROCEDURE — 3600000002 HC SURGERY LEVEL 2 BASE: Performed by: ORTHOPAEDIC SURGERY

## 2021-09-08 PROCEDURE — 3600000012 HC SURGERY LEVEL 2 ADDTL 15MIN: Performed by: ORTHOPAEDIC SURGERY

## 2021-09-08 PROCEDURE — C9353 NEURAWRAP NERVE PROTECTOR,CM: HCPCS | Performed by: ORTHOPAEDIC SURGERY

## 2021-09-08 PROCEDURE — 88304 TISSUE EXAM BY PATHOLOGIST: CPT

## 2021-09-08 PROCEDURE — C9352 NEURAGEN NERVE GUIDE, PER CM: HCPCS | Performed by: ORTHOPAEDIC SURGERY

## 2021-09-08 PROCEDURE — 7100000000 HC PACU RECOVERY - FIRST 15 MIN: Performed by: ORTHOPAEDIC SURGERY

## 2021-09-08 DEVICE — NEURAWRAP® NERVE PROTECTOR 7MM I.D. X 4CM LENGTH
Type: IMPLANTABLE DEVICE | Site: ELBOW | Status: FUNCTIONAL
Brand: NEURAWRAP®

## 2021-09-08 DEVICE — NEURAWRAP® NERVE PROTECTOR 5MM I.D. X 4CM LENGTH
Type: IMPLANTABLE DEVICE | Site: ELBOW | Status: FUNCTIONAL
Brand: NEURAWRAP®

## 2021-09-08 RX ORDER — LIDOCAINE HYDROCHLORIDE 20 MG/ML
INJECTION, SOLUTION EPIDURAL; INFILTRATION; INTRACAUDAL; PERINEURAL PRN
Status: DISCONTINUED | OUTPATIENT
Start: 2021-09-08 | End: 2021-09-08 | Stop reason: SDUPTHER

## 2021-09-08 RX ORDER — BUPIVACAINE HYDROCHLORIDE AND EPINEPHRINE 5; 5 MG/ML; UG/ML
INJECTION, SOLUTION EPIDURAL; INTRACAUDAL; PERINEURAL PRN
Status: DISCONTINUED | OUTPATIENT
Start: 2021-09-08 | End: 2021-09-08 | Stop reason: ALTCHOICE

## 2021-09-08 RX ORDER — GLYCOPYRROLATE 1 MG/5 ML
SYRINGE (ML) INTRAVENOUS PRN
Status: DISCONTINUED | OUTPATIENT
Start: 2021-09-08 | End: 2021-09-08 | Stop reason: SDUPTHER

## 2021-09-08 RX ORDER — OXYCODONE HYDROCHLORIDE AND ACETAMINOPHEN 5; 325 MG/1; MG/1
1 TABLET ORAL EVERY 6 HOURS PRN
Qty: 28 TABLET | Refills: 0 | Status: SHIPPED | OUTPATIENT
Start: 2021-09-08 | End: 2021-09-15

## 2021-09-08 RX ORDER — FENTANYL CITRATE 50 UG/ML
INJECTION, SOLUTION INTRAMUSCULAR; INTRAVENOUS PRN
Status: DISCONTINUED | OUTPATIENT
Start: 2021-09-08 | End: 2021-09-08 | Stop reason: SDUPTHER

## 2021-09-08 RX ORDER — SODIUM CHLORIDE 0.9 % (FLUSH) 0.9 %
5-40 SYRINGE (ML) INJECTION EVERY 12 HOURS SCHEDULED
Status: DISCONTINUED | OUTPATIENT
Start: 2021-09-08 | End: 2021-09-08 | Stop reason: HOSPADM

## 2021-09-08 RX ORDER — EPHEDRINE SULFATE/0.9% NACL/PF 50 MG/5 ML
SYRINGE (ML) INTRAVENOUS PRN
Status: DISCONTINUED | OUTPATIENT
Start: 2021-09-08 | End: 2021-09-08

## 2021-09-08 RX ORDER — DEXAMETHASONE SODIUM PHOSPHATE 4 MG/ML
INJECTION, SOLUTION INTRA-ARTICULAR; INTRALESIONAL; INTRAMUSCULAR; INTRAVENOUS; SOFT TISSUE PRN
Status: DISCONTINUED | OUTPATIENT
Start: 2021-09-08 | End: 2021-09-08 | Stop reason: SDUPTHER

## 2021-09-08 RX ORDER — EPHEDRINE SULFATE/0.9% NACL/PF 50 MG/5 ML
SYRINGE (ML) INTRAVENOUS PRN
Status: DISCONTINUED | OUTPATIENT
Start: 2021-09-08 | End: 2021-09-08 | Stop reason: SDUPTHER

## 2021-09-08 RX ORDER — SODIUM CHLORIDE 0.9 % (FLUSH) 0.9 %
5-40 SYRINGE (ML) INJECTION PRN
Status: DISCONTINUED | OUTPATIENT
Start: 2021-09-08 | End: 2021-09-08 | Stop reason: HOSPADM

## 2021-09-08 RX ORDER — SODIUM CHLORIDE 9 MG/ML
INJECTION, SOLUTION INTRAVENOUS CONTINUOUS
Status: DISCONTINUED | OUTPATIENT
Start: 2021-09-08 | End: 2021-09-08 | Stop reason: HOSPADM

## 2021-09-08 RX ORDER — PROMETHAZINE HYDROCHLORIDE 25 MG/ML
6.25 INJECTION, SOLUTION INTRAMUSCULAR; INTRAVENOUS
Status: DISCONTINUED | OUTPATIENT
Start: 2021-09-08 | End: 2021-09-08 | Stop reason: HOSPADM

## 2021-09-08 RX ORDER — SODIUM CHLORIDE 9 MG/ML
INJECTION, SOLUTION INTRAVENOUS CONTINUOUS PRN
Status: DISCONTINUED | OUTPATIENT
Start: 2021-09-08 | End: 2021-09-08 | Stop reason: SDUPTHER

## 2021-09-08 RX ORDER — PROPOFOL 10 MG/ML
INJECTION, EMULSION INTRAVENOUS PRN
Status: DISCONTINUED | OUTPATIENT
Start: 2021-09-08 | End: 2021-09-08 | Stop reason: SDUPTHER

## 2021-09-08 RX ORDER — SODIUM CHLORIDE 9 MG/ML
25 INJECTION, SOLUTION INTRAVENOUS PRN
Status: DISCONTINUED | OUTPATIENT
Start: 2021-09-08 | End: 2021-09-08 | Stop reason: HOSPADM

## 2021-09-08 RX ORDER — FIBRINOGEN HUMAN AND THROMBIN HUMAN 1 ML
10 KIT TOPICAL ONCE
Status: DISCONTINUED | OUTPATIENT
Start: 2021-09-08 | End: 2021-09-08 | Stop reason: HOSPADM

## 2021-09-08 RX ORDER — ONDANSETRON 2 MG/ML
INJECTION INTRAMUSCULAR; INTRAVENOUS PRN
Status: DISCONTINUED | OUTPATIENT
Start: 2021-09-08 | End: 2021-09-08 | Stop reason: SDUPTHER

## 2021-09-08 RX ADMIN — SODIUM CHLORIDE: 9 INJECTION, SOLUTION INTRAVENOUS at 09:40

## 2021-09-08 RX ADMIN — DEXAMETHASONE SODIUM PHOSPHATE 8 MG: 4 INJECTION, SOLUTION INTRAMUSCULAR; INTRAVENOUS at 07:51

## 2021-09-08 RX ADMIN — SODIUM CHLORIDE: 9 INJECTION, SOLUTION INTRAVENOUS at 06:45

## 2021-09-08 RX ADMIN — FENTANYL CITRATE 25 MCG: 50 INJECTION, SOLUTION INTRAMUSCULAR; INTRAVENOUS at 09:01

## 2021-09-08 RX ADMIN — LIDOCAINE HYDROCHLORIDE 60 MG: 20 INJECTION, SOLUTION EPIDURAL; INFILTRATION; INTRACAUDAL; PERINEURAL at 07:22

## 2021-09-08 RX ADMIN — FENTANYL CITRATE 25 MCG: 50 INJECTION, SOLUTION INTRAMUSCULAR; INTRAVENOUS at 09:22

## 2021-09-08 RX ADMIN — FENTANYL CITRATE 50 MCG: 50 INJECTION, SOLUTION INTRAMUSCULAR; INTRAVENOUS at 07:22

## 2021-09-08 RX ADMIN — Medication 0.2 MG: at 07:22

## 2021-09-08 RX ADMIN — FENTANYL CITRATE 25 MCG: 50 INJECTION, SOLUTION INTRAMUSCULAR; INTRAVENOUS at 08:05

## 2021-09-08 RX ADMIN — PROPOFOL 200 MG: 10 INJECTION, EMULSION INTRAVENOUS at 07:22

## 2021-09-08 RX ADMIN — Medication 2000 MG: at 07:26

## 2021-09-08 RX ADMIN — SODIUM CHLORIDE: 9 INJECTION, SOLUTION INTRAVENOUS at 07:17

## 2021-09-08 RX ADMIN — Medication 10 MG: at 07:35

## 2021-09-08 RX ADMIN — ONDANSETRON 4 MG: 2 INJECTION INTRAMUSCULAR; INTRAVENOUS at 07:51

## 2021-09-08 ASSESSMENT — PULMONARY FUNCTION TESTS
PIF_VALUE: 15
PIF_VALUE: 14
PIF_VALUE: 15
PIF_VALUE: 15
PIF_VALUE: 4
PIF_VALUE: 15
PIF_VALUE: 17
PIF_VALUE: 4
PIF_VALUE: 2
PIF_VALUE: 14
PIF_VALUE: 4
PIF_VALUE: 14
PIF_VALUE: 4
PIF_VALUE: 15
PIF_VALUE: 4
PIF_VALUE: 15
PIF_VALUE: 15
PIF_VALUE: 17
PIF_VALUE: 14
PIF_VALUE: 3
PIF_VALUE: 6
PIF_VALUE: 5
PIF_VALUE: 15
PIF_VALUE: 15
PIF_VALUE: 17
PIF_VALUE: 18
PIF_VALUE: 15
PIF_VALUE: 18
PIF_VALUE: 15
PIF_VALUE: 0
PIF_VALUE: 14
PIF_VALUE: 12
PIF_VALUE: 15
PIF_VALUE: 3
PIF_VALUE: 15
PIF_VALUE: 19
PIF_VALUE: 17
PIF_VALUE: 14
PIF_VALUE: 22
PIF_VALUE: 15
PIF_VALUE: 3
PIF_VALUE: 17
PIF_VALUE: 15
PIF_VALUE: 15
PIF_VALUE: 5
PIF_VALUE: 15
PIF_VALUE: 5
PIF_VALUE: 18
PIF_VALUE: 15
PIF_VALUE: 3
PIF_VALUE: 15
PIF_VALUE: 4
PIF_VALUE: 12
PIF_VALUE: 5
PIF_VALUE: 20
PIF_VALUE: 15
PIF_VALUE: 14
PIF_VALUE: 17
PIF_VALUE: 14
PIF_VALUE: 15
PIF_VALUE: 15
PIF_VALUE: 16
PIF_VALUE: 16
PIF_VALUE: 15
PIF_VALUE: 15
PIF_VALUE: 20
PIF_VALUE: 15
PIF_VALUE: 13
PIF_VALUE: 15
PIF_VALUE: 16
PIF_VALUE: 15
PIF_VALUE: 15
PIF_VALUE: 14
PIF_VALUE: 5
PIF_VALUE: 15
PIF_VALUE: 6
PIF_VALUE: 15
PIF_VALUE: 17
PIF_VALUE: 18
PIF_VALUE: 15
PIF_VALUE: 5
PIF_VALUE: 17
PIF_VALUE: 15
PIF_VALUE: 15
PIF_VALUE: 1
PIF_VALUE: 15
PIF_VALUE: 14
PIF_VALUE: 15
PIF_VALUE: 15
PIF_VALUE: 14
PIF_VALUE: 3
PIF_VALUE: 17
PIF_VALUE: 15
PIF_VALUE: 3
PIF_VALUE: 15
PIF_VALUE: 15
PIF_VALUE: 18
PIF_VALUE: 5
PIF_VALUE: 14
PIF_VALUE: 15
PIF_VALUE: 14
PIF_VALUE: 14
PIF_VALUE: 17
PIF_VALUE: 15
PIF_VALUE: 15
PIF_VALUE: 6
PIF_VALUE: 17
PIF_VALUE: 20
PIF_VALUE: 18
PIF_VALUE: 6
PIF_VALUE: 18
PIF_VALUE: 15
PIF_VALUE: 14
PIF_VALUE: 18
PIF_VALUE: 15
PIF_VALUE: 2
PIF_VALUE: 5
PIF_VALUE: 18
PIF_VALUE: 17
PIF_VALUE: 15
PIF_VALUE: 0
PIF_VALUE: 15
PIF_VALUE: 20
PIF_VALUE: 15
PIF_VALUE: 3
PIF_VALUE: 15
PIF_VALUE: 16
PIF_VALUE: 18
PIF_VALUE: 14
PIF_VALUE: 15
PIF_VALUE: 14
PIF_VALUE: 15
PIF_VALUE: 14
PIF_VALUE: 15
PIF_VALUE: 15
PIF_VALUE: 1
PIF_VALUE: 15
PIF_VALUE: 1
PIF_VALUE: 15
PIF_VALUE: 5
PIF_VALUE: 18
PIF_VALUE: 4
PIF_VALUE: 15
PIF_VALUE: 15
PIF_VALUE: 3
PIF_VALUE: 15
PIF_VALUE: 16
PIF_VALUE: 14
PIF_VALUE: 15
PIF_VALUE: 0
PIF_VALUE: 6
PIF_VALUE: 15
PIF_VALUE: 15

## 2021-09-08 ASSESSMENT — PAIN SCALES - GENERAL
PAINLEVEL_OUTOF10: 0

## 2021-09-08 ASSESSMENT — PAIN - FUNCTIONAL ASSESSMENT: PAIN_FUNCTIONAL_ASSESSMENT: 0-10

## 2021-09-08 ASSESSMENT — LIFESTYLE VARIABLES: SMOKING_STATUS: 0

## 2021-09-08 NOTE — PROGRESS NOTES
CLINICAL PHARMACY NOTE: MEDS TO BEDS    Total # of Prescriptions Filled: 1   The following medications were delivered to the patient:  · Percocet 5    Additional Documentation:

## 2021-09-08 NOTE — H&P
Department of Orthopedic Surgery  History and Physical        CHIEF COMPLAINT: Bilateral hand numbness and tingling     HISTORY OF PRESENT ILLNESS:                 The patient is a 70 y.o. male who presents with bilateral hand numbness and tingling. Patient originally had cubital tunnel release and carpal tunnel release bilaterally back in 2013 by Dr. Scott Velazco, around the time patient also had an ACDF of C 67 around the same time. After the surgery patient states he had almost no relief although he is continue to have numbness and tingling in bilateral hands. Recently had an EMG performed. Major complaint is weakness of the hands, most specifically when opening jars and anything involving  strength. Patient right-hand-dominant. Recently retired. Other orthopedic points this time. Saw a spine surgeon who stated that repeat procedure on the neck would likely not benefit. Patient does not want to proceed with any elective procedure.     Patient had an EMG/NCS dated 4/5/21     Right median nerve motor latency: 5.16          Left median nerve motor latency: 5.52  Right ulnar nerve velocity: 34 (previously 32 9/22/14)  Left ulnar nerve velocity: 34 (previously 35 9/22/14)       EMG portion of the exam demonstrates continued ulnar neuropathy no significant change in velocities from previous studies     Past Medical History:    Past Medical History             Diagnosis Date    Insomnia       problems sleeping through the night     Psychiatric problem           Past Surgical History:    Past Surgical History             Procedure Laterality Date    CARPAL TUNNEL RELEASE   2013     bilat hands     CERVICAL FUSION   2011     C6 & C7    SEPTOPLASTY   12/26/2017     FESS    TONSILLECTOMY             Current Medications:   Current Hospital Medications   No current facility-administered medications for this visit.      Allergies:  Patient has no known allergies.     Social History:   TOBACCO:   reports that tenderness over the A1 pulleys with no active triggering. Full flexion and extension of the fingers. - wartenbergs and cross finger testing, APB strength 5/5 with no atrophy. Median, ulnar, radial n intact to light touch. Brisk capillary refill. Gross motor 5/5.     Left upper extremity  Non-tender about the shoulder and elbow with good ROM. Previous surgical incision well-healed. - tinels of the cubital tunnel, + tinels of the carpal tunnel, - durkans, - finkelsteins, - CMC grind, - tenderness over the A1 pulleys with no active triggering. Full flexion and extension of the fingers. - wartenbergs and cross finger testing, APB strength 5/5 with no atrophy. Median, ulnar, radial n intact to light touch. Brisk capillary refill. Gross motor 5/5.         DATA:    CBC:         Lab Results   Component Value Date     WBC 6.3 10/08/2020     RBC 4.84 10/08/2020     HGB 14.5 10/08/2020     HCT 43.9 10/08/2020     MCV 90.7 10/08/2020     MCH 30.0 10/08/2020     MCHC 33.0 10/08/2020     RDW 13.9 10/08/2020      10/08/2020     MPV 11.6 10/08/2020      PT/INR:          Lab Results   Component Value Date     PROTIME 11.9 12/13/2013     INR 1.1 12/13/2013         Radiology Review: X-rays bilateral wrist 4 views AP, lateral, oblique, carpal view demonstrate no acute fractures dislocations. Carpal bones in appropriate alignment.,  CMC arthritis left greater than right  Impression: Bilateral CMC arthritis, left greater than right     IMPRESSION:  · Bilateral ulnar neuropathy at the cubital tunnel status post release  · Myelopathy     PLAN:  Discussed treatment diagnosis patient. Patient likely has component of both neck problems causing nerve dysfunction as well as continued compression of the ulnar nerve around the previous surgical site. Discussed all treatments with patients that this time he would like to proceed with revision cubital tunnel release with suture transfer of the AIN.  I have explained the risks and complications of the recommended surgery with the patient at length, as well as discussed potential treatment alternatives including nonoperative management. These risks include but are not limited to death or complication from anesthesia, continued pain, nerve tendon or vascular injury, infection, hematoma, deep vein thrombosis or pulmonary embolism, and need for further surgery, etc. Patient understood this, asked appropriate questions, which were all answered, and elected to proceed with the procedure.     I have seen and evaluated the patient and agree with the above assessment and plan on today's visit. I have performed the key components of the history and physical examination with significant findings of complex history with continued numbness and tingling status post bilateral submuscular ulnar nerve decompressions with no significant improvement and nerve conduction velocities when compared to 2014 nerve test.  Complexity of his ongoing symptoms were explained. Discussed revision surgery and increasing level of complexity given his previous submuscular transposition and continued symptomatology. He is also having EMG changes and intrinsic changes. Discussed supercharged and side nerve transfer with AIN to preserve motor function with concurrent revision decompression at elbow. Discussed that he may not have any significant improvement. Discussed continued atrophy and weakness of his hands. All questions answered. . I concur with the findings and plan as documented.     I explained the risks, benefits, alternatives and complications of surgery with the patient including but not limited to the risks of infection, possible damage to nerves, vessels, or tendons, stiffness, loss of range of motion, scar sensitivity, wound healing complications, worsening symptoms, possible need for therapy, as well as the possible need further surgery and unanticipated complications.   The patient voiced understanding and all questions were answered. The patient elected to proceed with surgical intervention.        The patient was counseled at length about the risks of nikos Covid-19 during their perioperative period and any recovery window from their procedure. The patient was made aware that nikos Covid-19  may worsen their prognosis for recovering from their procedure  and lend to a higher morbidity and/or mortality risk. All material risks, benefits, and reasonable alternatives including postponing the procedure were discussed. The patient does wish to proceed with the procedure at this time. History and Physical Update     Patient was seen and examined. Patient's history and physical was reviewed with the patient. There has been no significant interval changes.
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
## 2021-09-08 NOTE — ANESTHESIA POSTPROCEDURE EVALUATION
Department of Anesthesiology  Postprocedure Note    Patient: Fe Dorado  MRN: [id number]  YOB: 1950  Date of evaluation: 9/8/2021  Time:  11:46 AM     Procedure Summary     Date: 09/08/21 Room / Location: [facility] OR 05 / [facility]    Anesthesia Start: 0831 Anesthesia Stop: 5902    Procedure: LEFT REVISION ULNAR NERVE DECOMPRESSION AT ELBOW WITH SUPERCHARGED AND SIDE NERVE TRANSFER WITH  ANTERIOR INTEROSSEOUS NERVE (Left ) Diagnosis: (NUMBNESS AND TINGLING BILATERAL HANDS)    Surgeons: Anabella Lundberg MD Responsible Provider: Declan Kee MD    Anesthesia Type: general ASA Status: 2          Anesthesia Type: general    Maik Phase I: Maik Score: 10    Maik Phase II: Maik Score: 10    Last vitals: Reviewed and per EMR flowsheets.        Anesthesia Post Evaluation    Patient location during evaluation: PACU  Patient participation: complete - patient participated  Level of consciousness: awake and alert  Pain score: 0  Airway patency: patent  Nausea & Vomiting: no vomiting and no nausea  Complications: no  Cardiovascular status: hemodynamically stable  Respiratory status: spontaneous ventilation  Hydration status: stable

## 2021-09-09 NOTE — OP NOTE
[address] [address]                                OPERATIVE REPORT    PATIENT NAME: Katia Elizalde                    :        1950  MED REC NO:   [id number]                            ROOM:  ACCOUNT NO:   [de-identified]                           ADMIT DATE: 2021  PROVIDER:     Cheryle Shed, MD    DATE OF PROCEDURE:  2021    PREOPERATIVE DIAGNOSES:  1. Recurrent left cubital tunnel syndrome with intrinsic weakness of  hand. 2.  Left ulnar neuropathy of upper extremity. POSTOPERATIVE DIAGNOSES:  1. Left recurrent cubital tunnel syndrome at elbow. 2.  Left ulnar neuropathy of upper extremity. 3.  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 PROCEDURES PERFORMED:  1. Left revision ulnar nerve decompression at elbow. 2.  Left ulnar nerve external neurolysis. 3.  Ulnar nerve revision submuscular transposition. 4.  Increased level of complexity of ulnar nerve decompression and  neurolysis by 200%. Given the previous surgery, scar tissue, and  altered surgical anatomy increasing the surgical dissection time and  procedure by 200%. 5.  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 6.  Ulnar nerve decompression, in particular the deep motor branch at  level of wrist.  7.  [place] of anterior interosseous nerve for supercharged end-to-side  nerve transfer to ulnar motor branch of ulnar nerve at level of forearm. 8.  Anterior interosseous nerve transfer to ulnar motor branch of ulnar  nerve at level of forearm with the use of intraoperative microscope. 9.  Use of checkpoint nerve stimulator. 10.  Application of Integra nerve protectors; 10 mm and 7 mm in diameter  for a total length of 8 cm. 11.  Use of intraoperative microscope for nerve transfer. SURGEON:  Cheryle Shed, M.D. ANESTHESIA:  General.    ASSISTANTS:  1.   Maritza Mares DO, Orthopedic Surgery Resident. 2.  [place] physician assistant certified. She was present  throughout the procedure. Her assistance was used for preoperative  positioning, intraoperative retraction, closure, and dressing  application. Her assistance expedited the case and decreased the  surgical time. ESTIMATED BLOOD LOSS:  Minimal.    TOTAL TOURNIQUET TIME:  1. First tourniquet time was approximately 63 minutes for the ulnar  nerve decompression with neurolysis revision and submuscular  transposition at elbow. 2.  After a down tourniquet time of approximately one-half hour, a  second tourniquet time of 43 minutes was used to perform the distal  ulnar nerve decompression and nerve transfer. FINDINGS:  1. Significant scar tissue of the ulnar nerve beneath the flexor  pronator musculature that was neurolysed with significant hourglass  configuration and a Z-deformity to the nerve at the distal margin of the  flexor carpi ulnaris fascia. 2.  Status post ulnar nerve revision decompression, neurolysis, and  mobilization of the soft tissues, a smooth arc of transposition was  ensured. 3.  Status post neurolysis, nerve protectors were applied to prevent  re-scarring. 4.  The flexor pronator origin was repaired loosely over the revised  nerve decompression without any residual entrapment. 5.  There was a large neuroma of the medial antebrachial cutaneous nerve  and scar tissue surrounding the nerve directly over the medial  epicondyle. This was excised. 6.  Ulnar nerve decompression through Guyon's canal was completed fully  decompressing the motor branch. 7.  Anterior interosseous nerve transposition was achieved in a  tension-free fashion and confirmed under fluoroscopy. 8.  Use of intraoperative checkpoint nerve stimulator was used to ensure  the ulnar motor branches of the nerve prior to supercharged end-to-side  transfer. COMPLICATIONS:  None.     DISPOSITION:  The patient remained stable throughout the procedure. OPERATIVE INDICATIONS:  The patient is a 66-year-old gentleman with  worsening weakness involving his left upper extremity. He did have a  previous submuscular ulnar nerve decompression at elbow with initial  good results, but recent worsening and weakness of the hand. Nerve  studies confirmed weakness of the intrinsics, confirming the patient's  complaint of weakness of his hand as well as persistent ulnar nerve  dysfunction. After extensive discussion with the patient, he wished to  proceed with revision ulnar nerve decompression at the elbow as well as  decompression at the level of the wrist with a supercharged end-to-side  nerve transfer to preserve intrinsic function as nerve healing  progressed at the level of the elbow. Furthermore, the patient  understands unpredictable results with revision surgery, particularly  ulnar nerve compression at the elbow as well as potential for continued,  worsening, and loss of hand function. In addition, the risks included  infection, damage to nerves, vessels, or tendons, failure to improve his  symptoms or worsening of his symptoms, unforeseen complications, need  for therapy and/or additional surgery. He voiced understanding and all  questions were answered and he wished to proceed with surgical  intervention. DESCRIPTION OF PROCEDURE:  The patient was identified in the holding  area. The left upper extremity was identified as the surgical site. He  was then seen by Anesthesia, taken to the operating room, placed supine  on the operating table, and underwent general anesthesia per the  Anesthesia Department. All bony prominences were well padded. A  well-padded arm tourniquet was placed. The left upper extremity was  prepared and draped in the standard sterile fashion. Arm was  exsanguinated. Tourniquet was inflated to 250 mmHg. The first  tourniquet time was approximately 63 minutes.     His lengthy scar over his medial elbow was then recreated with a  15-blade scalpel. This was approximately 15 to 20 cm in length over the  medial epicondyle extending up the medial arm as well as extending  distally. A flap of tissue was raised from posterior to anterior off  the medial epicondyle. Immediately encountered was dense scar tissue,  particularly surrounding the medial antebrachial cutaneous nerve, which  was severely scarred to the medial epicondyle. This was initially  preserved and dissection was performed proximally to identify the ulnar  nerve approximately 10 to 12 cm above the medial epicondyle. This was  transposed anteriorly and was traced anteriorly under loupe  magnification to the level of the flexor pronator origin, where there  was dense scar tissue encountered. With the flap elevated, meticulous  dissection under loupe magnification was undertaken. There was dense  scar tissue altering the surgical anatomy. This increased the surgical  dissection time considerably. The flexor pronator origin was released  off the medial epicondyle while protecting the ulnar nerve and traced  distally. The flexor carpi ulnaris fascia appeared to be well preserved  and did not appear to be previously addressed at the previous  decompression. The ulnar nerve took a significant turn in almost a  Z-type fashion at this level and this was meticulously dissected out and  decompressed. There was an hourglass type configuration at this level  to the nerve as well as decreased nerve diameter distal to this site. External neurolysis was undertaken to fully mobilize the nerve off the  deep fascia of the flexor pronator origin and flexor carpi ulnaris. External neurolysis was undertaken removing dense scar tissue  surrounding the nerve. To prevent re-scarring of the nerve, an Integra  nerve protector, one with 10-cm diameter above and a 7-cm diameter below  the point of maximum entrapment.   These were sewn into place with 7-0  nylon suture extended  distally. The deep motor branch of the ulnar nerve as well as the deep  branch of the ulnar artery around the hook of the hamate were identified  and the deep intrinsic fascia was released fully decompressing the deep  motor branch. This decompressed the ulnar nerve at the level of the  wrist and through Guyon's canal.    Attention was then directed towards the mid forearm. The superficial  and deep flexors were retracted from ulnar to radial to identify the  pronator quadratus. Above this, the anterior interosseous nerve and  artery were identified and traced into the pronator quadratus dividing  the muscle fascia and neurolysing the interosseous nerve until two large  fascicles were achieved distally. The nerve was then released and  brought out proximally and further neurolysed to allow for radial to  ulnar excursion to the ulnar nerve at the level of the takeoff of the  superficial dorsal ulnar cutaneous nerve branch of the ulnar nerve. With these fascicles in proper position and tension free excursion  ensured, the wound was copiously irrigated out and hemostasis was  achieved with bipolar cautery. The microscope was then brought in. The checkpoint nerve stimulator was  used to isolate the ulnar fascicles which were the central third of the  ulnar nerve. An aponeurotomy to these fascicles was then created and  under microscopic magnification, 8-0 nylon sutures were used to sew the  two large fascicles into position within the ulnar motor portion of the  ulnar nerve. This was reinforced with thrombin glue. The wound was  copiously irrigated out. The tourniquet was deflated. Hemostasis was  achieved with bipolar cautery. There was excellent flow to the ulnar  artery. Meticulous hemostasis was achieved. Skin was closed with  multiple nylon sutures. Bulky sterile dressing and splint was applied.         Tosha Marquez MD    D: 09/08/2021 17:56:10       T: 09/08/2021 18:02:55 [email or web address]  Job#: [id number]     Doc#: [id number]    CC:

## 2021-09-16 ENCOUNTER — OFFICE VISIT (OUTPATIENT)
Dept: ORTHOPEDIC SURGERY | Age: 71
End: 2021-09-16

## 2021-09-16 VITALS — BODY MASS INDEX: 23.7 KG/M2 | RESPIRATION RATE: 20 BRPM | HEIGHT: 69 IN | WEIGHT: 160 LBS

## 2021-09-16 DIAGNOSIS — R20.0 NUMBNESS AND TINGLING IN BOTH HANDS: Primary | ICD-10-CM

## 2021-09-16 DIAGNOSIS — R20.2 NUMBNESS AND TINGLING IN BOTH HANDS: Primary | ICD-10-CM

## 2021-09-16 PROCEDURE — 99024 POSTOP FOLLOW-UP VISIT: CPT | Performed by: ORTHOPAEDIC SURGERY

## 2021-09-16 NOTE — PROGRESS NOTES
HPI: Patient presents today postop day #8 status post left revision ulnar nerve decompression at the elbow with submuscular transposition and ulnar nerve decompression at the wrist with AIN to ulnar motor nerve transfer. Overall the patient is doing well. He does report numbness to his small finger. Pain is controlled. Physical Exam: Sutures are clean dry intact with sutures in place. No erythema or signs of infection. Positive swelling and ecchymosis to the elbow. Positive Wartenberg's. No sensation to the small finger. There is diminished but intact sensation to the ulnar aspect of the ring finger. Brisk capillary refill. Otherwise NVI. Assessment: postop day #8 status post left revision ulnar nerve decompression at the elbow with submuscular transposition and ulnar nerve decompression at the wrist with AIN to ulnar motor nerve transfer    Plan:   Sutures removed today in the office. Scar management advised. Patient placed in a cock-up wrist brace. Patient educated on no range of motion to the wrist.  Educated patient on nerve recovery. Anticipate 6 months to a year for full nerve recovery. Activity restrictions reviewed with the patient. HEP and ROM exercises demonstrated to the patient. Follow up in 3-4 weeks  All questions answered. I have seen and evaluated the patient and agree with the above assessment and plan on today's visit. I have performed the key components of the history and physical examination with significant findings of postop revision ulnar nerve decompression at elbow with neurolysis, decompression at wrist, and AIN to supercharged end to side ulnar nerve motor transfer. Patient notes significant weakness of the intrinsics. Also significant numbness with paresthesias intermittently into the small finger. Findings were explained. Anticipate further recovery. Patient fitted with a cock-up wrist brace. . I concur with the findings and plan as documented.     Fabricio Arellano Whitley Leslie MD  9/16/2021

## 2021-09-21 ENCOUNTER — OFFICE VISIT (OUTPATIENT)
Dept: PRIMARY CARE CLINIC | Age: 71
End: 2021-09-21
Payer: MEDICARE

## 2021-09-21 VITALS
HEART RATE: 77 BPM | DIASTOLIC BLOOD PRESSURE: 78 MMHG | SYSTOLIC BLOOD PRESSURE: 130 MMHG | OXYGEN SATURATION: 95 % | BODY MASS INDEX: 24.22 KG/M2 | TEMPERATURE: 97.9 F | WEIGHT: 164 LBS

## 2021-09-21 DIAGNOSIS — G56.93 BILATERAL NEUROPATHY OF UPPER EXTREMITIES: ICD-10-CM

## 2021-09-21 DIAGNOSIS — M76.31 ILIOTIBIAL BAND SYNDROME OF RIGHT SIDE: ICD-10-CM

## 2021-09-21 DIAGNOSIS — M25.551 RIGHT HIP PAIN: Primary | ICD-10-CM

## 2021-09-21 PROCEDURE — G8427 DOCREV CUR MEDS BY ELIG CLIN: HCPCS | Performed by: INTERNAL MEDICINE

## 2021-09-21 PROCEDURE — G8420 CALC BMI NORM PARAMETERS: HCPCS | Performed by: INTERNAL MEDICINE

## 2021-09-21 PROCEDURE — 3017F COLORECTAL CA SCREEN DOC REV: CPT | Performed by: INTERNAL MEDICINE

## 2021-09-21 PROCEDURE — 1036F TOBACCO NON-USER: CPT | Performed by: INTERNAL MEDICINE

## 2021-09-21 PROCEDURE — 4040F PNEUMOC VAC/ADMIN/RCVD: CPT | Performed by: INTERNAL MEDICINE

## 2021-09-21 PROCEDURE — 99213 OFFICE O/P EST LOW 20 MIN: CPT | Performed by: INTERNAL MEDICINE

## 2021-09-21 PROCEDURE — 1123F ACP DISCUSS/DSCN MKR DOCD: CPT | Performed by: INTERNAL MEDICINE

## 2021-09-21 RX ORDER — PREGABALIN 200 MG/1
200 CAPSULE ORAL 2 TIMES DAILY
Qty: 60 CAPSULE | Refills: 0 | Status: SHIPPED
Start: 2021-09-21 | End: 2021-09-23

## 2021-09-21 RX ORDER — METHYLPREDNISOLONE 4 MG/1
TABLET ORAL
Qty: 1 KIT | Refills: 0 | Status: SHIPPED
Start: 2021-09-21 | End: 2021-11-15
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
## 2021-09-22 DIAGNOSIS — G56.93 BILATERAL NEUROPATHY OF UPPER EXTREMITIES: ICD-10-CM

## 2021-09-23 RX ORDER — PREGABALIN 200 MG/1
CAPSULE ORAL
Qty: 60 CAPSULE | Refills: 2 | Status: SHIPPED
Start: 2021-09-23 | End: 2021-12-17

## 2021-10-07 DIAGNOSIS — M25.559 HIP PAIN: Primary | ICD-10-CM

## 2021-10-14 ENCOUNTER — HOSPITAL ENCOUNTER (OUTPATIENT)
Dept: GENERAL RADIOLOGY | Age: 71
Discharge: HOME | End: 2021-10-16
Payer: MEDICARE

## 2021-10-14 ENCOUNTER — HOSPITAL ENCOUNTER (OUTPATIENT)
Age: 71
Discharge: HOME | End: 2021-10-16
Payer: MEDICARE

## 2021-10-14 ENCOUNTER — OFFICE VISIT (OUTPATIENT)
Dept: ORTHOPEDIC SURGERY | Age: 71
End: 2021-10-14

## 2021-10-14 VITALS — BODY MASS INDEX: 22.96 KG/M2 | RESPIRATION RATE: 20 BRPM | HEIGHT: 69 IN | WEIGHT: 155 LBS

## 2021-10-14 DIAGNOSIS — R20.0 NUMBNESS AND TINGLING IN BOTH HANDS: Primary | ICD-10-CM

## 2021-10-14 DIAGNOSIS — R20.2 NUMBNESS AND TINGLING IN BOTH HANDS: Primary | ICD-10-CM

## 2021-10-14 DIAGNOSIS — M25.559 HIP PAIN: ICD-10-CM

## 2021-10-14 PROCEDURE — 72100 X-RAY EXAM L-S SPINE 2/3 VWS: CPT

## 2021-10-14 PROCEDURE — 99024 POSTOP FOLLOW-UP VISIT: CPT | Performed by: ORTHOPAEDIC SURGERY

## 2021-10-14 PROCEDURE — 73521 X-RAY EXAM HIPS BI 2 VIEWS: CPT

## 2021-10-25 ENCOUNTER — EVALUATION (OUTPATIENT)
Dept: OCCUPATIONAL THERAPY | Age: 71
End: 2021-10-25
Payer: MEDICARE

## 2021-10-25 DIAGNOSIS — R20.2 TINGLING SENSATION: ICD-10-CM

## 2021-10-25 DIAGNOSIS — R20.2 NUMBNESS AND TINGLING: Primary | ICD-10-CM

## 2021-10-25 DIAGNOSIS — R20.0 NUMBNESS AND TINGLING: Primary | ICD-10-CM

## 2021-10-25 PROCEDURE — 97018 PARAFFIN BATH THERAPY: CPT | Performed by: OCCUPATIONAL THERAPIST

## 2021-10-25 PROCEDURE — 97530 THERAPEUTIC ACTIVITIES: CPT | Performed by: OCCUPATIONAL THERAPIST

## 2021-10-25 PROCEDURE — 97110 THERAPEUTIC EXERCISES: CPT | Performed by: OCCUPATIONAL THERAPIST

## 2021-10-25 NOTE — PROGRESS NOTES
[facility] OT  [address] [address]  Dept: Veronique Jones [address] [facility] OT Fax: [phone]    Date:  10/25/2021  Initial Evaluation Date: 10/25/2021   Evaluating Therapist: Marshal Coleman OT    Patient Name:  Missy Cheng    :  1950    Restrictions/Precautions:  none, low fall risk  Diagnosis:  R20.0, R20.2 (ICD-10-CM) - Numbness and tingling in both hands       Date of Surgery/Injury:     Insurance/Certification information:  [facility] Medicare  Plan of care signed (Y/N): N  Visit# / total visits:     Referring Practitioner:  Gabe Holly MD  Specific Practitioner Orders: Scar desensitization and management, strengthening as tolerated, modalities prn    Assessment of current deficits   [] Functional mobility  [x] ADLs  [x] Strength   [] Cognition   [] Functional transfers   [x] IADLs  [] Safety Awareness  [x] Endurance   [x] Fine Motor Coordination  [] Balance  [] Vision/perception  [x] Sensation    [] Gross Motor Coordination [] ROM  [] Pain   [] Edema    [x] Scar Adhesion/Skin Integrity     OT PLAN OF CARE   OT POC based on physician orders, patient diagnosis and results of clinical assessment    Frequency/Duration 1-2x a week for 12 visits  Specific OT Treatment to include:     [x] Instruction in HEP                   Modalities:  [x] Therapeutic Exercise        [x] Ultrasound               [] Electrical Stimulation/Attended  [x] PROM/Stretching                    [x] Fluidotherapy          [x]  Paraffin                   [x] AAROM  [x] AROM                 [] Iontophoresis:   [] Tendon Glides                                               [] Neuromuscular Re-Ed            [x] ADL/IADL re-training    [x] Therapeutic Activity       [] Pain Management with/without modalities PRN                 [x] Manual Therapy                      [] Splinting                      [x] Scar Management []Joint Protection/Training  []Ergonomics                             [] Joint Mobilization        [] Adaptive Equipment Assessment/Training                             [] Manual Edema Mobilization   [] Myofascial Release                 [] Energy Conservation/Work Simplification  [x] GM/FM Coordination       [] Safety retraining/education per  individual diagnosis/goals  [] Desensitization       Patient Specific Goal: To be able to use a fork and regain strength in his hand                             GOALS (Long term same as Short term):  1) Patient will demonstrate good understanding of home program (exercises/activities/diagnosis/prognosis/goals) with good accuracy. 2) Patient will report ADL functions as Mod I/I using LUE to complete slight resistive ADL tasks-opening bottles for bathing. 3) Patient will demonstrate increased /pinch strength of at least 10 / 5 pinch pounds of their L hand. 4) Increase in fine motor function as evidenced by decreased time to complete 9-hole peg test and/or MRMT test by at least 5-10 seconds. 5) Patient will demonstrate a non-tender/non-adherent scar. 6) Patient will report IADL functions as Mod I/I using LUE to complete slight resistive ADL tasks- opening zip lock bags for meal preparation. 7) Patient will decrease QuickDASH score to 25% or less for increased participation in daily functional activities.        Past Medical History:   Past Medical History:   Diagnosis Date    Arthritis     Insomnia     problems sleeping through the night     Psychiatric problem     Ulnar nerve compression     left     Past Surgical History:   Past Surgical History:   Procedure Laterality Date    ARM SURGERY Left 9/8/2021    LEFT REVISION ULNAR NERVE DECOMPRESSION AT ELBOW WITH SUPERCHARGED AND SIDE NERVE TRANSFER WITH  ANTERIOR INTEROSSEOUS NERVE performed by Tierney Neal MD at [address] [place] [address]  2013    bilat hands     CERVICAL FUSION 2011    C6 & C7    COLONOSCOPY      SEPTOPLASTY  12/26/2017    FESS    TONSILLECTOMY      UPPER GASTROINTESTINAL ENDOSCOPY         Reason for Referral: Pt is a 70year old male who presents this date for initial occupational therapy evaluation. Pt underwent surgery on 9/8/2021 detailed below and presents with persistent numbness in the middle finger of the L hand as well as decreased hand function. Pt referred to occupational therapy services for scar desensitization and management and strengthening as tolerated to increase hand function for ADL and IADL participation and completion. Surgical report from 9/8/2021.      1.  Left revision ulnar nerve decompression at elbow. 2.  Left ulnar nerve external neurolysis. 3.  Ulnar nerve revision submuscular transposition. 4.  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 5.  Ulnar nerve decompression, in particular the deep motor branch at  level of wrist.  6.  [place] of anterior interosseous nerve for supercharged end-to-side  nerve transfer to ulnar motor branch of ulnar nerve at level of forearm. 7.  Anterior interosseous nerve transfer to ulnar motor branch of ulnar  nerve at level of forearm with the use of intraoperative microscope. Home Living: lives at home with wife  Prior Level of Function: independent    Cognition:   Alert/Oriented x3     IADL STATUS:   Ind Mod I Min A Mod A Max A Dep Other   Homemaking Responsibility    x      Shopping Responsibility:  x        Mode of Transportation: x         Leisure & Hobbies:  x        Work:   x         Comments: Pt is able to do most activities around the home but still has to ask his wife for assistance for light  Resistance fine motor activities such as opening zip lock bags and bottles.       ADL STATUS:   Ind Mod I Min A Mod A Max A Dep Other   Feeding:   x       Grooming:  x        Bathing:   x       UE Dressing:  x        LE Dressing:  x        Toileting:  x Transfers:  x          Comments: Pt is bale to perform all ADLs but is unable to open small bags and bottles for bathing, pump bottles in shower are being used at this time. Pt states difficulty using feeding utensils with in hand manipulation. Pain Level: moderate numbness and tingling in his L hand    UE Assessment:  Left Upper Extremity ROM  ROM with LUE is WFL in all planes of motion with slight limitation of thumb motion due to arthritis. Left Hand ROM  Composite fist: full  Opposition from thumb to SF finger    Comment: Hand Dominance is right    Sensation: numbness and tingling throughout the hand but pt states he has no sensation in his SF on his L hand. To assess in following sessions. Dynamometer (setting 2):     Left: 45#      Right: 75#    Pinch Meter:   Lateral: Left= 7#,Right= 14#    Palmar 3 point: Left= 8#, Right= 12#  9 Hole Peg Test:   Left: 30 seconds   Right: 22 seconds    QuickDASH Score: not tested this date    Intervention: Pt tolerated session well-started session with fine motor activities, In hand manipulation with pegs. Slight resistive activities with Light resistive putty, manipulation of objects out of putty, 3 point pinch and rolling. Pt tolerated well with moderate difficulty noted with in hand manipulation skills. Scar massage performed at the end of the session for desensitization. Issued and instructed patient in HEP detailed below. Pt able to perform all HEP activities with no questions or concerns.      HEP:    Scar massage-circular motion, across the scar, along the scar-massage for 3-5 mins 3 times a day for desensitization and scar management   Silicone gel pads-to be applied under tubigrip to be worn at night for scar management to reduce scar rigidity   Elbow and wrist AROM-all planes to continue to maintain ROM and decrease scar tissue formation   Theraputty-issued this date for strengthening and desensitization of the digits and hand as well as to decrease Comments/Revisions:                   Physicians's Printed Name:  Jessika Calixto MD                                   [de-identified] Signature:                                                               Date:      Please review Patient's OT evaluation and if you agree sign/date and fax back to us at our [address] [place] [address] OT Fax: [phone].  Thank you for your referral!

## 2021-10-29 ENCOUNTER — TREATMENT (OUTPATIENT)
Dept: OCCUPATIONAL THERAPY | Age: 71
End: 2021-10-29
Payer: MEDICARE

## 2021-10-29 DIAGNOSIS — R20.0 NUMBNESS AND TINGLING: Primary | ICD-10-CM

## 2021-10-29 DIAGNOSIS — R20.2 NUMBNESS AND TINGLING: Primary | ICD-10-CM

## 2021-10-29 DIAGNOSIS — R20.2 TINGLING SENSATION: ICD-10-CM

## 2021-10-29 PROCEDURE — 97530 THERAPEUTIC ACTIVITIES: CPT | Performed by: OCCUPATIONAL THERAPIST

## 2021-10-29 PROCEDURE — 97140 MANUAL THERAPY 1/> REGIONS: CPT | Performed by: OCCUPATIONAL THERAPIST

## 2021-10-29 PROCEDURE — 97110 THERAPEUTIC EXERCISES: CPT | Performed by: OCCUPATIONAL THERAPIST

## 2021-10-29 NOTE — PROGRESS NOTES
OCCUPATIONAL THERAPY PROGRESS NOTE    Date:  10/29/2021  Initial Evaluation Date: 10/25/2021      Patient Name:  Nohelia Hyatt    :  1950    Restrictions/Precautions:  none, low fall risk  Diagnosis:  R20.0, R20.2 (ICD-10-CM) - Numbness and tingling in both hands                                                      Date of Surgery/Injury:      Insurance/Certification information:  [place] [employer]  Plan of care signed (Y/N): N  Visit# / total visits:      Referring Practitioner:  Dennis Reveles MD  Specific Practitioner Orders: Scar desensitization and management, strengthening as tolerated, modalities prn     Assessment of current deficits   []? Functional mobility             [x]? ADLs          [x]? Strength                  []? Cognition   []? Functional transfers           [x]? IADLs         []? Safety Awareness  [x]? Endurance   [x]? Fine Motor Coordination    []? Balance      []? Vision/perception   [x]? Sensation     []? Gross Motor Coordination []? ROM           []? Pain                        []? Edema          [x]? Scar Adhesion/Skin Integrity      OT PLAN OF CARE   OT POC based on physician orders, patient diagnosis and results of clinical assessment     Frequency/Duration 1-2x a week for 12 visits  Specific OT Treatment to include:      [x]? Instruction in HEP                   Modalities:  [x]?  Therapeutic Exercise                 [x]? Ultrasound               []? Electrical Stimulation/Attended  [x]? PROM/Stretching                    [x]? Fluidotherapy          [x]?   Paraffin                   [x]? AAROM  [x]?  AROM                 []? Iontophoresis:   []? Tendon Mario Faust  []? Neuromuscular Re-Ed            [x]? ADL/IADL re-training    [x]?  Therapeutic Activity                  []? Pain Management with/without modalities PRN                 [x]?  Manual Therapy                      []? Splinting                                   [x]?  Scar Management                   []?Joint Protection/Training  []? Ergonomics                             []? Joint Mobilization                      []? Adaptive Equipment Assessment/Training                             []? Manual Edema Mobilization   []? Myofascial Release                 []? Energy Conservation/Work Simplification  [x]? GM/FM Coordination                []? Safety retraining/education per  individual diagnosis/goals  []? Desensitization        Patient Specific Goal: To be able to use a fork and regain strength in his hand                             GOALS (Long term same as Short term):  1) Patient will demonstrate good understanding of home program (exercises/activities/diagnosis/prognosis/goals) with good accuracy. 2) Patient will report ADL functions as Mod I/I using LUE to complete slight resistive ADL tasks-opening bottles for bathing. 3) Patient will demonstrate increased /pinch strength of at least 10 / 5 pinch pounds of their L hand. 4) Increase in fine motor function as evidenced by decreased time to complete 9-hole peg test and/or MRMT test by at least 5-10 seconds. 5) Patient will demonstrate a non-tender/non-adherent scar. 6) Patient will report IADL functions as Mod I/I using LUE to complete slight resistive ADL tasks- opening zip lock bags for meal preparation. 7) Patient will decrease QuickDASH score to 25% or less for increased participation in daily functional activities. Pain Level: no pain    Subjective: \"This is really difficult. \"     Objective:  Updated POC to be completed by 10th session or 11/25/2021.     INTERVENTION: COMPLETED: SPECIFICS/COMMENTS:   Modality:               AROM:     wrist x All planes   digtis X    X  X    x -All planes, abduction, hook fist and full composite fist  -towel scrunches  -adduction with cotton balls to increase SF AROM abduction  -opposition to SF with pennies to place in container   AAROM:               PROM/Stretching: Scar Mass/Edema Control:     Scar massage x For desensitization and to reduce scar tissue         Strengthening:               Other:     HEP x Reviewed HEP previously given as well as added abduction exercises with cotton balls   Stereoagnosis x Finding objects in beans with vision occluded to find      Assessment/Comments: Pt is making Fair + progress toward stated plan of care. Pt tolerated session well with all novel exercises this date. Pt unable to adduct SF with minimal place and hold of SF to adduction to RF. Pt encouraged to adduct to  cotton balls at home and continue to perform towel scrunches all digits and individual digits isolation to increase AROM. Will increase as tolerated. -Rehab Potential: Good  -Requires OT Follow Up: Yes    Time In: 10:00a            Time Out: 11:00a             Visit #: 2    Treatment Charges: Mins Units   Modalities     Ther Exercise 15 1   Manual Therapy 15 1   Thera Activities 30 2   ADL/Home Mgt      Neuro Re-education     Gait Training     Group Therapy     Non-Billable Service Time     Other     Total Time/Units 60 4       -Response to Treatment: tolerated session well   Goals: Goals for pt can be see on initial eval occurring on IE    Plan:   [x]  Continues Plan of care: Treatment covered based on POC and graduated to patient's progress. Pt education continues at each visit to obtain maximum benefits from skilled OT intervention. []  Alter Plan of care:   []  Discharge:       [address] [facility], OTR/L #[id number]

## 2021-11-01 ENCOUNTER — TREATMENT (OUTPATIENT)
Dept: OCCUPATIONAL THERAPY | Age: 71
End: 2021-11-01
Payer: MEDICARE

## 2021-11-01 DIAGNOSIS — R20.2 TINGLING SENSATION: ICD-10-CM

## 2021-11-01 DIAGNOSIS — R20.0 NUMBNESS AND TINGLING: Primary | ICD-10-CM

## 2021-11-01 DIAGNOSIS — R20.2 NUMBNESS AND TINGLING: Primary | ICD-10-CM

## 2021-11-01 PROCEDURE — 97140 MANUAL THERAPY 1/> REGIONS: CPT | Performed by: OCCUPATIONAL THERAPIST

## 2021-11-01 PROCEDURE — 97022 WHIRLPOOL THERAPY: CPT | Performed by: OCCUPATIONAL THERAPIST

## 2021-11-01 PROCEDURE — 97110 THERAPEUTIC EXERCISES: CPT | Performed by: OCCUPATIONAL THERAPIST

## 2021-11-01 NOTE — PROGRESS NOTES
OCCUPATIONAL THERAPY PROGRESS NOTE    Date:  2021  Initial Evaluation Date: 10/25/2021      Patient Name:  Sanna Garcia    :  1950    Restrictions/Precautions:  none, low fall risk  Diagnosis:  L Ulnar Nerve Decompression; R20.0, R20.2 (ICD-10-CM) - Numbness and tingling in both hands    Date of Surgery/Injury: 1392     Insurance/Certification information:  [employer] Medicare  Plan of care signed (Y/N): Y - cosigned  Visit# / total visits: 3 / 12     Referring Practitioner:  Isabella Dominguez MD  Specific Practitioner Orders: Scar desensitization and management, strengthening as tolerated, modalities prn     Assessment of current deficits   [x]? ADLs          [x]? Strength         [x]? IADLs        [x]? Endurance     [x]? FM Coordination     [x]? Sensation             [x]? Scar Adhesion/Skin Integrity      OT PLAN OF CARE   OT POC based on physician orders, patient diagnosis and results of clinical assessment     Frequency/Duration 1-2x a week for 12 visits Certification period From: 10/25/2021  To: 2021    Specific OT Treatment to include:   [x]? Instruction in HEP                   Modalities:  [x]?  Therapeutic Exercise              [x]? Ultrasound                 [x]? PROM/Stretching                    [x]? Fluidotherapy          [x]?   Paraffin                   [x]? AAROM  [x]?  AROM                  [x]? Desensitization/Sensory Reintegration     [x]?  Tendon/Nerve Glides              [x]? ADL/IADL re-training    [x]?  Therapeutic Activity                 [x]? GM/FM Coordination             [x]? Mariana Seo                     [x]? Scar Management                                  Patient Specific Goal: To be able to use a fork and regain strength in his hand                             GOALS (Long term same as Short term):  1) Patient will demonstrate good understanding of home program (exercises/activities/diagnosis/prognosis/goals) with good accuracy.   2) Patient will report ADL functions as Mod I/I using LUE to complete slight resistive ADL tasks-opening bottles for bathing. 3) Patient will demonstrate increased /pinch strength of at least 10 / 5 pinch pounds of their L hand. 4) Increase in fine motor function as evidenced by decreased time to complete 9-hole peg test and/or MRMT test by at least 5-10 seconds. 5) Patient will demonstrate a non-tender/non-adherent scar. 6) Patient will report IADL functions as Mod I/I using LUE to complete slight resistive ADL tasks- opening zip lock bags for meal preparation. 7) Patient will decrease QuickDASH score to 25% or less for increased participation in daily functional activities. Assessed 11/1 - 43%. TODAY'S TREATMENT     Pain Level: no pain    Subjective: Pt reported \"I feel my motor of my little finger is getting better but the numbness is still there. I will need to leave 10 til 9 today. \"     Objective:  Updated POC to be completed by 10th session or 11/25/2021. INTERVENTION: COMPLETED: SPECIFICS/COMMENTS:   Modality:     Fluidotherapy x 10 min for sensory reintegration (finding various graded objects and identifying texture/size/shape) and AROM of the digits and wrist.        AROM/AAROM:     L Wrist AROM  All planes   L UE Nerve Gliding x -x1 technique ulnar nerve gliding to reduce numbness and tingling   L SF AROM     X  -opposition to SF with pennies to place in container  -Adducting marbles between RF/SF to strengthen SF add   L Digital AROM X      X  X    -All planes, abduction, hook fist and full composite fist  -towel scrunches  -Digital abd/add x 15 reps. Added to HEP. -Composite fist with blue sponges between each digit x 15 reps to stretch palm of hand   PROM/Stretching:               Scar Mass/Edema Control:     Scar Tissue Manipulation x Over the volar/ulnar wrist and over the cubital tunnel to reduce scar tissue and increase tissue elasticity        Strengthening:     L Hand/Digital x -Putty: SF adduction.  Added to HEP.        Other:     HEP x Reviewed HEP previously given as well as added abduction exercises with cotton balls. Provided with sponges and marble 11/1. Stereoagnosis  Finding objects in beans with vision occluded to find      Assessment/Comments: Pt is making Fair + progress toward stated plan of care. Session shortened d/t having to leave session early. Session focused on building HEP addressing sensory and motor deficits. Added ulnar nerve gliding, composite fisting with sponge spacers, and additional SF adduction exercises to HEP with further review on scar tissue management with good understanding. Pt to be completing exercises 3x/day at 10 reps ea. Reports he is doing putty strengthening 3x/day for 10 minutes each time. Therapist educated to reduce to 1x/day with increase of focus on nerve gliding and improving motor mobility of the SF. Will continue to progress toward reducing nerve sensations and enhancing motor movement of the L hand. QuickDASH Assessed 11/1: 43% disability.     -Rehab Potential: Good  -Requires OT Follow Up: Yes    Time In: 8:05a            Time Out: 8:50a             Visit #: 3  Treatment Charges: Mins Units   Modalities 10 1   Ther Exercise 20 1   Manual Therapy 15 1   Thera Activities     ADL/Home Mgt      Neuro Re-education     Gait Training     Group Therapy     Non-Billable Service Time     Other     Total Time/Units 45 3     -Response to Treatment: Reports increased tingling/numbness with ulnar nerve gliding. Plan:   [x]  Continues Plan of care: Treatment covered based on POC and graduated to patient's progress. Pt education continues at each visit to obtain maximum benefits from skilled OT intervention.   []  Alter Plan of care:   []  Discharge:      Shanika Willoughby, OT R/L, [address], #[id number]

## 2021-11-08 ENCOUNTER — TREATMENT (OUTPATIENT)
Dept: OCCUPATIONAL THERAPY | Age: 71
End: 2021-11-08
Payer: MEDICARE

## 2021-11-08 DIAGNOSIS — R20.2 TINGLING SENSATION: ICD-10-CM

## 2021-11-08 DIAGNOSIS — R20.2 NUMBNESS AND TINGLING: Primary | ICD-10-CM

## 2021-11-08 DIAGNOSIS — R20.0 NUMBNESS AND TINGLING: Primary | ICD-10-CM

## 2021-11-08 PROCEDURE — 97022 WHIRLPOOL THERAPY: CPT | Performed by: OCCUPATIONAL THERAPIST

## 2021-11-08 PROCEDURE — 97140 MANUAL THERAPY 1/> REGIONS: CPT | Performed by: OCCUPATIONAL THERAPIST

## 2021-11-08 PROCEDURE — 97110 THERAPEUTIC EXERCISES: CPT | Performed by: OCCUPATIONAL THERAPIST

## 2021-11-08 NOTE — PROGRESS NOTES
OCCUPATIONAL THERAPY PROGRESS NOTE    Date:  2021  Initial Evaluation Date: 10/25/2021      Patient Name:  Teresa Lobato    :  1950    Restrictions/Precautions:  none, low fall risk  Diagnosis:  L Ulnar Nerve Decompression; R20.0, R20.2 (ICD-10-CM) - Numbness and tingling in both hands    Date of Surgery/Injury:      Insurance/Certification information:  [employer] Medicare  Plan of care signed (Y/N): Y - cosigned  Visit# / total visits:      Referring Practitioner:  Alok Jefferson MD  Specific Practitioner Orders: Scar desensitization and management, strengthening as tolerated, modalities prn     Assessment of current deficits   [x]? ADLs          [x]? Strength         [x]? IADLs        [x]? Endurance     [x]? FM Coordination     [x]? Sensation             [x]? Scar Adhesion/Skin Integrity      OT PLAN OF CARE   OT POC based on physician orders, patient diagnosis and results of clinical assessment     Frequency/Duration 1-2x a week for 12 visits Certification period From: 10/25/2021  To: 2021    Specific OT Treatment to include:   [x]? Instruction in HEP                   Modalities:  [x]?  Therapeutic Exercise              [x]? Ultrasound                 [x]? PROM/Stretching                    [x]? Fluidotherapy          [x]?   Paraffin                   [x]? AAROM  [x]?  AROM                  [x]? Desensitization/Sensory Reintegration     [x]?  Tendon/Nerve Glides              [x]? ADL/IADL re-training    [x]?  Therapeutic Activity                 [x]? GM/FM Coordination             [x]? Dahiana Solders                     [x]? Scar Management                                  Patient Specific Goal: To be able to use a fork and regain strength in his hand                             GOALS (Long term same as Short term):  1) Patient will demonstrate good understanding of home program (exercises/activities/diagnosis/prognosis/goals) with good accuracy.   2) Patient will report ADL functions as Mod I/I using LUE to complete slight resistive ADL tasks-opening bottles for bathing. 3) Patient will demonstrate increased /pinch strength of at least 10 / 5 pinch pounds of their L hand. 4) Increase in fine motor function as evidenced by decreased time to complete 9-hole peg test and/or MRMT test by at least 5-10 seconds. 5) Patient will demonstrate a non-tender/non-adherent scar. 6) Patient will report IADL functions as Mod I/I using LUE to complete slight resistive ADL tasks- opening zip lock bags for meal preparation. 7) Patient will decrease QuickDASH score to 25% or less for increased participation in daily functional activities. Assessed 11/1 - 43%. TODAY'S TREATMENT     Pain Level: no pain    Subjective: Pt reported \"my hand is always stiff and tingling in the morning\"     Objective:  Updated POC to be completed by 10th session or 11/25/2021. INTERVENTION: COMPLETED: SPECIFICS/COMMENTS:   Modality:     Fluidotherapy x 10 min for sensory reintegration (finding various graded objects and identifying texture/size/shape) and AROM of the digits and wrist.        AROM/AAROM:     L Wrist AROM  All planes   L UE Nerve Gliding x -x1 technique ulnar nerve gliding to reduce numbness and tingling   L SF AROM x    X  -opposition to SF with pennies to place in container  -Adducting marbles between RF/SF to strengthen SF add   L Digital AROM     x  X      -All planes, abduction, hook fist and full composite fist  -towel scrunches  -Digital abd/add x 15 reps. Added to HEP. -Composite fist with blue sponges between each digit x 15 reps to stretch palm of hand   PROM/Stretching:               Scar Mass/Edema Control:     Scar Tissue Manipulation x Over the volar/ulnar wrist and over the cubital tunnel to reduce scar tissue and increase tissue elasticity        Strengthening:     L Hand/Digital X  x -Putty: SF adduction.  Added to HEP.  -manipulation of beads out of putty 10 pennies        Other: HEP x Reviewed HEP previously given as well as added abduction exercises with cotton balls. Provided with sponges and marble 11/1. Stereoagnosis  Finding objects in beans with vision occluded to find      Assessment/Comments: Pt is making Fair + progress toward stated plan of care. Pt tolerated session well-states that he is compliant with new HEP instructions from previous session with addition of tension gliding. Pt able to adduction SF better this date during functional grasping patterns with opposition and gripping activities. Will increase as tolerated. -Rehab Potential: Good  -Requires OT Follow Up: Yes    Time In: 8:10a            Time Out: 8:55a             Visit #: 4  Treatment Charges: Mins Units   Modalities-fluido 10 1   Ther Exercise 20 1   Manual Therapy 15 1   Thera Activities     ADL/Home Mgt      Neuro Re-education     Gait Training     Group Therapy     Non-Billable Service Time     Other     Total Time/Units 45 3     -Response to Treatment: Reports increased tingling/numbness with ulnar nerve gliding. Plan:   [x]  Continues Plan of care: Treatment covered based on POC and graduated to patient's progress. Pt education continues at each visit to obtain maximum benefits from skilled OT intervention. []  Alter Plan of care:   []  Discharge:       [facility], OT R/L, [address], #[id number]

## 2021-11-11 ENCOUNTER — TREATMENT (OUTPATIENT)
Dept: OCCUPATIONAL THERAPY | Age: 71
End: 2021-11-11
Payer: MEDICARE

## 2021-11-11 ENCOUNTER — HOSPITAL ENCOUNTER (OUTPATIENT)
Dept: GENERAL RADIOLOGY | Age: 71
Discharge: HOME | End: 2021-11-13
Payer: MEDICARE

## 2021-11-11 ENCOUNTER — HOSPITAL ENCOUNTER (OUTPATIENT)
Age: 71
Discharge: HOME | End: 2021-11-13
Payer: MEDICARE

## 2021-11-11 DIAGNOSIS — R20.0 NUMBNESS AND TINGLING: Primary | ICD-10-CM

## 2021-11-11 DIAGNOSIS — R20.2 TINGLING SENSATION: ICD-10-CM

## 2021-11-11 DIAGNOSIS — M54.2 NECK PAIN: ICD-10-CM

## 2021-11-11 DIAGNOSIS — M54.2 NECK PAIN: Primary | ICD-10-CM

## 2021-11-11 DIAGNOSIS — R20.2 NUMBNESS AND TINGLING: Primary | ICD-10-CM

## 2021-11-11 PROCEDURE — 97140 MANUAL THERAPY 1/> REGIONS: CPT | Performed by: OCCUPATIONAL THERAPIST

## 2021-11-11 PROCEDURE — 72050 X-RAY EXAM NECK SPINE 4/5VWS: CPT

## 2021-11-11 PROCEDURE — 97022 WHIRLPOOL THERAPY: CPT | Performed by: OCCUPATIONAL THERAPIST

## 2021-11-11 PROCEDURE — 97110 THERAPEUTIC EXERCISES: CPT | Performed by: OCCUPATIONAL THERAPIST

## 2021-11-11 NOTE — PROGRESS NOTES
OCCUPATIONAL THERAPY PROGRESS NOTE    Date:  2021  Initial Evaluation Date: 10/25/2021      Patient Name:  Jazmine Stallings    :  1950    Restrictions/Precautions:  none, low fall risk  Diagnosis:  L Ulnar Nerve Decompression; R20.0, R20.2 (ICD-10-CM) - Numbness and tingling in both hands    Date of Surgery/Injury:      Insurance/Certification information:  [employer] Medicare  Plan of care signed (Y/N): Y - cosigned  Visit# / total visits:      Referring Practitioner:  Toshia Arcos MD  Specific Practitioner Orders: Scar desensitization and management, strengthening as tolerated, modalities prn     Assessment of current deficits   [x]? ADLs          [x]? Strength         [x]? IADLs        [x]? Endurance     [x]? FM Coordination     [x]? Sensation             [x]? Scar Adhesion/Skin Integrity      OT PLAN OF CARE   OT POC based on physician orders, patient diagnosis and results of clinical assessment     Frequency/Duration 1-2x a week for 12 visits Certification period From: 10/25/2021  To: 2021    Specific OT Treatment to include:   [x]? Instruction in HEP                   Modalities:  [x]?  Therapeutic Exercise              [x]? Ultrasound                 [x]? PROM/Stretching                    [x]? Fluidotherapy          [x]?   Paraffin                   [x]? AAROM  [x]?  AROM                  [x]? Desensitization/Sensory Reintegration     [x]?  Tendon/Nerve Glides              [x]? ADL/IADL re-training    [x]?  Therapeutic Activity                 [x]? GM/FM Coordination             [x]? Manju Cobble                     [x]? Scar Management                                  Patient Specific Goal: To be able to use a fork and regain strength in his hand                             GOALS (Long term same as Short term):  1) Patient will demonstrate good understanding of home program (exercises/activities/diagnosis/prognosis/goals) with good accuracy.   2) Patient will report ADL functions as Mod I/I using LUE to complete slight resistive ADL tasks-opening bottles for bathing. 3) Patient will demonstrate increased /pinch strength of at least 10 / 5 pinch pounds of their L hand. 4) Increase in fine motor function as evidenced by decreased time to complete 9-hole peg test and/or MRMT test by at least 5-10 seconds. 5) Patient will demonstrate a non-tender/non-adherent scar. 6) Patient will report IADL functions as Mod I/I using LUE to complete slight resistive ADL tasks- opening zip lock bags for meal preparation. 7) Patient will decrease QuickDASH score to 25% or less for increased participation in daily functional activities. Assessed 11/1 - 43%. TODAY'S TREATMENT     Pain Level: no pain    Subjective: Pt reported \"I think the numbness is a little better\"     Objective:  Updated POC to be completed by 10th session or 11/25/2021. INTERVENTION: COMPLETED: SPECIFICS/COMMENTS:   Modality:     Fluidotherapy x 10 min for sensory reintegration (finding various graded objects and identifying texture/size/shape) and AROM of the digits and wrist.        AROM/AAROM:     L Wrist AROM  All planes   L UE Nerve Gliding x -x1 technique ulnar nerve gliding to reduce numbness and tingling   L SF AROM x    X  -opposition to SF with pennies to place in container  -Adducting marbles between RF/SF to strengthen SF add   L Digital AROM     x  X      -All planes, abduction, hook fist and full composite fist  -towel scrunches  -Digital abd/add x 15 reps. Added to HEP. -Composite fist with blue sponges between each digit x 15 reps to stretch palm of hand   PROM/Stretching:               Scar Mass/Edema Control:     Scar Tissue Manipulation x Over the volar/ulnar wrist and over the cubital tunnel to reduce scar tissue and increase tissue elasticity        Strengthening:     L Hand/Digital X      x -Putty: SF adduction.  Added to HEP.  -manipulation of beads out of putty 10 pennies  -resistive clips-radial moderate green clip to  30 poms poms   Forearm  X    x -red 10# therabar 15 reps supination and pronation  -wrist flexion and extension with 2# dumbbell 10 reps   Other:     HEP x Reviewed HEP previously given as well as added abduction exercises with cotton balls. Provided with sponges and marble 11/1. Stereoagnosis  Finding objects in beans with vision occluded to find      Assessment/Comments: Pt is making Fair + progress toward stated plan of care. Strengthening increased this date with forearm and digital strengthening initiated. Pt demonstrates increased motion and place and hold with SF adduction. Will increase as tolerated. -Rehab Potential: Good  -Requires OT Follow Up: Yes    Time In: 8:10a            Time Out: 8:55a             Visit #: 5  Treatment Charges: Mins Units   Modalities-fluido 10 1   Ther Exercise 20 1   Manual Therapy 15 1   Thera Activities     ADL/Home Mgt      Neuro Re-education     Gait Training     Group Therapy     Non-Billable Service Time     Other     Total Time/Units 45 3     -Response to Treatment: Reports increased tingling/numbness with ulnar nerve gliding. Plan:   [x]  Continues Plan of care: Treatment covered based on POC and graduated to patient's progress. Pt education continues at each visit to obtain maximum benefits from skilled OT intervention. []  Alter Plan of care:   []  Discharge:       [facility], OT R/L, [address], #[id number]

## 2021-11-15 ENCOUNTER — TREATMENT (OUTPATIENT)
Dept: OCCUPATIONAL THERAPY | Age: 71
End: 2021-11-15
Payer: MEDICARE

## 2021-11-15 ENCOUNTER — OFFICE VISIT (OUTPATIENT)
Dept: PRIMARY CARE CLINIC | Age: 71
End: 2021-11-15
Payer: MEDICARE

## 2021-11-15 VITALS
DIASTOLIC BLOOD PRESSURE: 82 MMHG | OXYGEN SATURATION: 94 % | TEMPERATURE: 98 F | HEART RATE: 63 BPM | BODY MASS INDEX: 24.51 KG/M2 | WEIGHT: 166 LBS | SYSTOLIC BLOOD PRESSURE: 136 MMHG

## 2021-11-15 DIAGNOSIS — E29.1 HYPOGONADISM IN MALE: ICD-10-CM

## 2021-11-15 DIAGNOSIS — R20.2 TINGLING SENSATION: ICD-10-CM

## 2021-11-15 DIAGNOSIS — E78.00 HYPERCHOLESTEROLEMIA: ICD-10-CM

## 2021-11-15 DIAGNOSIS — M54.2 NECK PAIN: ICD-10-CM

## 2021-11-15 DIAGNOSIS — M25.551 RIGHT HIP PAIN: ICD-10-CM

## 2021-11-15 DIAGNOSIS — Z00.00 ROUTINE GENERAL MEDICAL EXAMINATION AT A HEALTH CARE FACILITY: ICD-10-CM

## 2021-11-15 DIAGNOSIS — Z12.5 PROSTATE CANCER SCREENING: ICD-10-CM

## 2021-11-15 DIAGNOSIS — R20.0 NUMBNESS AND TINGLING: Primary | ICD-10-CM

## 2021-11-15 DIAGNOSIS — R20.2 NUMBNESS AND TINGLING: Primary | ICD-10-CM

## 2021-11-15 DIAGNOSIS — Z23 NEED FOR INFLUENZA VACCINATION: Primary | ICD-10-CM

## 2021-11-15 LAB
ALBUMIN SERPL-MCNC: 4.5 G/DL (ref 3.5–5.2)
ALP BLD-CCNC: 69 U/L (ref 40–129)
ALT SERPL-CCNC: 14 U/L (ref 0–40)
ANION GAP SERPL CALCULATED.3IONS-SCNC: 18 MMOL/L (ref 7–16)
AST SERPL-CCNC: 28 U/L (ref 0–39)
BILIRUB SERPL-MCNC: 0.4 MG/DL (ref 0–1.2)
BUN BLDV-MCNC: 9 MG/DL (ref 6–23)
CALCIUM SERPL-MCNC: 9.4 MG/DL (ref 8.6–10.2)
CHLORIDE BLD-SCNC: 101 MMOL/L (ref 98–107)
CHOLESTEROL, TOTAL: 219 MG/DL (ref 0–199)
CO2: 22 MMOL/L (ref 22–29)
CREAT SERPL-MCNC: 0.8 MG/DL (ref 0.7–1.2)
GFR AFRICAN AMERICAN: >60
GFR NON-AFRICAN AMERICAN: >60 ML/MIN/1.73
GLUCOSE BLD-MCNC: 96 MG/DL (ref 74–99)
HCT VFR BLD CALC: 46.1 % (ref 37–54)
HDLC SERPL-MCNC: 82 MG/DL
HEMOGLOBIN: 14.9 G/DL (ref 12.5–16.5)
LDL CHOLESTEROL CALCULATED: 126 MG/DL (ref 0–99)
MCH RBC QN AUTO: 30.1 PG (ref 26–35)
MCHC RBC AUTO-ENTMCNC: 32.3 % (ref 32–34.5)
MCV RBC AUTO: 93.1 FL (ref 80–99.9)
PDW BLD-RTO: 13.8 FL (ref 11.5–15)
PLATELET # BLD: 232 E9/L (ref 130–450)
PMV BLD AUTO: 11.7 FL (ref 7–12)
POTASSIUM SERPL-SCNC: 4.6 MMOL/L (ref 3.5–5)
PROSTATE SPECIFIC ANTIGEN: 0.47 NG/ML (ref 0–4)
RBC # BLD: 4.95 E12/L (ref 3.8–5.8)
SODIUM BLD-SCNC: 141 MMOL/L (ref 132–146)
TOTAL PROTEIN: 7.5 G/DL (ref 6.4–8.3)
TRIGL SERPL-MCNC: 54 MG/DL (ref 0–149)
VLDLC SERPL CALC-MCNC: 11 MG/DL
WBC # BLD: 8.6 E9/L (ref 4.5–11.5)

## 2021-11-15 PROCEDURE — G8484 FLU IMMUNIZE NO ADMIN: HCPCS | Performed by: INTERNAL MEDICINE

## 2021-11-15 PROCEDURE — 1123F ACP DISCUSS/DSCN MKR DOCD: CPT | Performed by: INTERNAL MEDICINE

## 2021-11-15 PROCEDURE — 4040F PNEUMOC VAC/ADMIN/RCVD: CPT | Performed by: INTERNAL MEDICINE

## 2021-11-15 PROCEDURE — 97022 WHIRLPOOL THERAPY: CPT | Performed by: OCCUPATIONAL THERAPIST

## 2021-11-15 PROCEDURE — 90694 VACC AIIV4 NO PRSRV 0.5ML IM: CPT | Performed by: INTERNAL MEDICINE

## 2021-11-15 PROCEDURE — G0439 PPPS, SUBSEQ VISIT: HCPCS | Performed by: INTERNAL MEDICINE

## 2021-11-15 PROCEDURE — 3017F COLORECTAL CA SCREEN DOC REV: CPT | Performed by: INTERNAL MEDICINE

## 2021-11-15 PROCEDURE — 97140 MANUAL THERAPY 1/> REGIONS: CPT | Performed by: OCCUPATIONAL THERAPIST

## 2021-11-15 PROCEDURE — G0008 ADMIN INFLUENZA VIRUS VAC: HCPCS | Performed by: INTERNAL MEDICINE

## 2021-11-15 PROCEDURE — 97110 THERAPEUTIC EXERCISES: CPT | Performed by: OCCUPATIONAL THERAPIST

## 2021-11-15 RX ORDER — SILDENAFIL CITRATE 25 MG
25 TABLET ORAL PRN
Qty: 30 TABLET | Refills: 1 | Status: SHIPPED | OUTPATIENT
Start: 2021-11-15

## 2021-11-15 ASSESSMENT — LIFESTYLE VARIABLES
HOW OFTEN DURING THE LAST YEAR HAVE YOU BEEN UNABLE TO REMEMBER WHAT HAPPENED THE NIGHT BEFORE BECAUSE YOU HAD BEEN DRINKING: 0
HOW OFTEN DURING THE LAST YEAR HAVE YOU HAD A FEELING OF GUILT OR REMORSE AFTER DRINKING: 0
HAS A RELATIVE, FRIEND, DOCTOR, OR ANOTHER HEALTH PROFESSIONAL EXPRESSED CONCERN ABOUT YOUR DRINKING OR SUGGESTED YOU CUT DOWN: 0
HOW OFTEN DURING THE LAST YEAR HAVE YOU NEEDED AN ALCOHOLIC DRINK FIRST THING IN THE MORNING TO GET YOURSELF GOING AFTER A NIGHT OF HEAVY DRINKING: 0
HOW OFTEN DO YOU HAVE A DRINK CONTAINING ALCOHOL: 4
HOW MANY STANDARD DRINKS CONTAINING ALCOHOL DO YOU HAVE ON A TYPICAL DAY: 0
AUDIT-C TOTAL SCORE: 4
HAVE YOU OR SOMEONE ELSE BEEN INJURED AS A RESULT OF YOUR DRINKING: 0
AUDIT TOTAL SCORE: 4
HOW OFTEN DURING THE LAST YEAR HAVE YOU FAILED TO DO WHAT WAS NORMALLY EXPECTED FROM YOU BECAUSE OF DRINKING: 0
HOW OFTEN DURING THE LAST YEAR HAVE YOU FOUND THAT YOU WERE NOT ABLE TO STOP DRINKING ONCE YOU HAD STARTED: 0
HOW OFTEN DO YOU HAVE SIX OR MORE DRINKS ON ONE OCCASION: 0

## 2021-11-15 ASSESSMENT — PATIENT HEALTH QUESTIONNAIRE - PHQ9
SUM OF ALL RESPONSES TO PHQ9 QUESTIONS 1 & 2: 0
SUM OF ALL RESPONSES TO PHQ QUESTIONS 1-9: 0
2. FEELING DOWN, DEPRESSED OR HOPELESS: 0
1. LITTLE INTEREST OR PLEASURE IN DOING THINGS: 0

## 2021-11-15 NOTE — PATIENT INSTRUCTIONS
Personalized Preventive Plan for Ciro Robertson - 11/15/2021  Medicare offers a range of preventive health benefits. Some of the tests and screenings are paid in full while other may be subject to a deductible, co-insurance, and/or copay. Some of these benefits include a comprehensive review of your medical history including lifestyle, illnesses that may run in your family, and various assessments and screenings as appropriate. After reviewing your medical record and screening and assessments performed today your provider may have ordered immunizations, labs, imaging, and/or referrals for you. A list of these orders (if applicable) as well as your Preventive Care list are included within your After Visit Summary for your review. Other Preventive Recommendations:    · A preventive eye exam performed by an eye specialist is recommended every 1-2 years to screen for glaucoma; cataracts, macular degeneration, and other eye disorders. · A preventive dental visit is recommended every 6 months. · Try to get at least 150 minutes of exercise per week or 10,000 steps per day on a pedometer . · Order or download the FREE \"Exercise & Physical Activity: Your Everyday Guide\" from [employer]. Call 8-973.188.8072 or search [employer] online. · You need 7191-6123 mg of calcium and 4625-9210 IU of vitamin D per day. It is possible to meet your calcium requirement with diet alone, but a vitamin D supplement is usually necessary to meet this goal.  · When exposed to the sun, use a sunscreen that protects against both UVA and UVB radiation with an SPF of 30 or greater. Reapply every 2 to 3 hours or after sweating, drying off with a towel, or swimming. · Always wear a seat belt when traveling in a car. Always wear a helmet when riding a bicycle or motorcycle.

## 2021-11-15 NOTE — PROGRESS NOTES
Medicare Annual Wellness Visit  Name: Vel Dominguez Date: 11/15/2021   MRN: [id number] Sex: Male   Age: 70 y.o. Ethnicity: Non- / Non    : 1950 Race: White (non-)      Radha Hernandez is here for Medicare AWV    Screenings for behavioral, psychosocial and functional/safety risks, and cognitive dysfunction are all negative except as indicated below. These results, as well as other patient data from the [address] form, are documented in Flowsheets linked to this Encounter. No Known Allergies    Prior to Visit Medications    Medication Sig Taking? Authorizing Provider   VIAGRA 25 MG tablet Take 1 tablet by mouth as needed for Erectile Dysfunction Yes Antony Haley DO   pregabalin (LYRICA) 200 MG capsule TAKE 1 CAPSULE BY MOUTH TWICE DAILY  Antony Haley DO   diazePAM (VALIUM) 2 MG tablet Take 2 mg by mouth nightly as needed for Anxiety.   Historical Provider, MD   Multiple Vitamins-Minerals (THERAPEUTIC MULTIVITAMIN-MINERALS) tablet Take 1 tablet by mouth daily  Historical Provider, MD   Melatonin 5 MG CAPS Take 1 tablet by mouth nightly  Historical Provider, MD   NONFORMULARY Tumeric and clogen  Historical Provider, MD   vitamin B-1 (THIAMINE) 100 MG tablet Take 100 mg by mouth daily   Historical Provider, MD   vitamin E 1000 units capsule Take by mouth daily   Historical Provider, MD   Omega-3 Fatty Acids (FISH OIL) 1000 MG CAPS Take 1,000 mg by mouth 3 times daily   Historical Provider, MD       Past Medical History:   Diagnosis Date    Arthritis     Insomnia     problems sleeping through the night     Psychiatric problem     Ulnar nerve compression     left       Past Surgical History:   Procedure Laterality Date    ARM SURGERY Left 2021    LEFT REVISION ULNAR NERVE DECOMPRESSION AT ELBOW WITH SUPERCHARGED AND SIDE NERVE TRANSFER WITH  ANTERIOR INTEROSSEOUS NERVE performed by Gilmer Malone MD at [address] [place] [address] 2013    bilat hands     CERVICAL FUSION  2011    C6 & C7    COLONOSCOPY      SEPTOPLASTY  12/26/2017    FESS    TONSILLECTOMY      UPPER GASTROINTESTINAL ENDOSCOPY         No family history on file. CareTeam (Including outside providers/suppliers regularly involved in providing care):   Patient Care Team:  Brigette Salter DO as PCP - General (Internal Medicine)  Brigette Salter DO as PCP - [facility] Empaneled Provider  Roger Campbell DO as Consulting Physician (Otolaryngology)    Wt Readings from Last 3 Encounters:   11/15/21 166 lb (75.3 kg)   10/14/21 155 lb (70.3 kg)   09/21/21 164 lb (74.4 kg)     Vitals:    11/15/21 0922   BP: 136/82   Pulse: 63   Temp: 98 °F (36.7 °C)   SpO2: 94%   Weight: 166 lb (75.3 kg)     Body mass index is 24.51 kg/m². Based upon direct observation of the patient, evaluation of cognition reveals recent and remote memory intact.     General Appearance: alert and oriented to person, place and time, well developed and well- nourished, in no acute distress  Skin: warm and dry, no rash or erythema  Head: normocephalic and atraumatic  Eyes: pupils equal, round, and reactive to light, extraocular eye movements intact, conjunctivae normal  ENT: tympanic membrane, external ear and ear canal normal bilaterally, nose without deformity, nasal mucosa and turbinates normal without polyps  Neck: supple and non-tender without mass, no thyromegaly or thyroid nodules, no cervical lymphadenopathy  Pulmonary/Chest: clear to auscultation bilaterally- no wheezes, rales or rhonchi, normal air movement, no respiratory distress  Cardiovascular: normal rate, regular rhythm, normal S1 and S2, no murmurs, rubs, clicks, or gallops, distal pulses intact, no carotid bruits  Abdomen: soft, non-tender, non-distended, normal bowel sounds, no masses or organomegaly  Extremities: no cyanosis, clubbing or edema  Musculoskeletal: normal range of motion, no joint swelling, deformity or tenderness  Neurologic: Conjugate 13-valent (Norlpte03) 12/18/2017    Tdap (Boostrix, Adacel) 09/26/2015    Zoster Live (Zostavax) 01/28/2018        Health Maintenance   Topic Date Due    AAA screen  Never done    Shingles Vaccine (2 of 3) 03/25/2018    Pneumococcal 65+ years Vaccine (2 of 2 - PPSV23) 12/18/2018    Flu vaccine (1) 09/01/2021    COVID-19 Vaccine (3 - Booster for Gutiérrez Peter series) 09/01/2021    Annual Wellness Visit (AWV)  11/24/2021    Colon cancer screen colonoscopy  04/24/2025    DTaP/Tdap/Td vaccine (2 - Td or Tdap) 09/26/2025    Lipid screen  10/08/2025    Hepatitis C screen  Completed    Hepatitis A vaccine  Aged Out    Hepatitis B vaccine  Aged Out    Hib vaccine  Aged Out    Meningococcal (ACWY) vaccine  Aged Out     Recommendations for [employer] Due: see orders and patient instructions/AVS.  . Recommended screening schedule for the next 5-10 years is provided to the patient in written form: see Patient Instructions/AVS.    Hannah Kaur was seen today for medicare awv. Diagnoses and all orders for this visit:    Need for influenza vaccination  -     INFLUENZA, QUADV, ADJUVANTED, 65 YRS =, IM, PF, PREFILL SYR, 0.5ML (FLUAD)    Other orders  -     VIAGRA 25 MG tablet; Take 1 tablet by mouth as needed for Erectile Dysfunction             At last appointment he was given stretches for IT band syndrome as well as a course of Medrol. He has been following up with orthopedic hand surgery. He has been going to occupational therapy. His x-rays revealed multiple areas of degeneration as well as post operative changes of the cervical spine. He has a history of anterior cervical fusion and discectomy in 2012 of C6-7. Assessment    1. Neck pain  2. Right hip pain  3. Neuroapthy    Plan    1. Continue Lyrica  2. I will refer him to physical therapy for his hip and neck  3.  We discussed the possibility of MRI of the cervical spine if his symptoms persist

## 2021-11-15 NOTE — PROGRESS NOTES
OCCUPATIONAL THERAPY PROGRESS NOTE    Date:  11/15/2021  Initial Evaluation Date: 10/25/2021      Patient Name:  Devi Varma    :  1950    Restrictions/Precautions:  none, low fall risk  Diagnosis:  L Ulnar Nerve Decompression; R20.0, R20.2 (ICD-10-CM) - Numbness and tingling in both hands    Date of Surgery/Injury:      Insurance/Certification information:  [employer] Medicare  Plan of care signed (Y/N): Y - cosigned  Visit# / total visits:      Referring Practitioner:  Neeru Dillard MD  Specific Practitioner Orders: Scar desensitization and management, strengthening as tolerated, modalities prn     Assessment of current deficits   [x]? ADLs          [x]? Strength         [x]? IADLs        [x]? Endurance     [x]? FM Coordination     [x]? Sensation             [x]? Scar Adhesion/Skin Integrity      OT PLAN OF CARE   OT POC based on physician orders, patient diagnosis and results of clinical assessment     Frequency/Duration 1-2x a week for 12 visits Certification period From: 10/25/2021  To: 2021    Specific OT Treatment to include:   [x]? Instruction in HEP                   Modalities:  [x]?  Therapeutic Exercise              [x]? Ultrasound                 [x]? PROM/Stretching                    [x]? Fluidotherapy          [x]?   Paraffin                   [x]? AAROM  [x]?  AROM                  [x]? Desensitization/Sensory Reintegration     [x]?  Tendon/Nerve Glides              [x]? ADL/IADL re-training    [x]?  Therapeutic Activity                 [x]? GM/FM Coordination             [x]? Shun [place]                     [x]? Scar Management                                  Patient Specific Goal: To be able to use a fork and regain strength in his hand                             GOALS (Long term same as Short term):  1) Patient will demonstrate good understanding of home program (exercises/activities/diagnosis/prognosis/goals) with good accuracy.   2) Patient will report ADL functions as Mod I/I using LUE to complete slight resistive ADL tasks-opening bottles for bathing. 3) Patient will demonstrate increased /pinch strength of at least 10 / 5 pinch pounds of their L hand. 4) Increase in fine motor function as evidenced by decreased time to complete 9-hole peg test and/or MRMT test by at least 5-10 seconds. 5) Patient will demonstrate a non-tender/non-adherent scar. 6) Patient will report IADL functions as Mod I/I using LUE to complete slight resistive ADL tasks- opening zip lock bags for meal preparation. 7) Patient will decrease QuickDASH score to 25% or less for increased participation in daily functional activities. Assessed 11/1 - 43%. TODAY'S TREATMENT     Pain Level: no pain    Subjective: Pt reported \"I am getting better\"     Objective:  Updated POC to be completed by 10th session or 11/25/2021. INTERVENTION: COMPLETED: SPECIFICS/COMMENTS:   Modality:     Fluidotherapy x 10 min for sensory reintegration (finding various graded objects and identifying texture/size/shape) and AROM of the digits and wrist.        AROM/AAROM:     L Wrist AROM  All planes   L UE Nerve Gliding x -x1 technique ulnar nerve gliding to reduce numbness and tingling   L SF AROM x    X  -opposition to SF with pennies to place in container  -Adducting marbles between RF/SF to strengthen SF add   L Digital AROM     x  X         x -All planes, abduction, hook fist and full composite fist  -towel scrunches  -Digital abd/add x 15 reps. Added to HEP. -Composite fist with blue sponges between each digit x 15 reps to stretch palm of hand  -opposition alternating stringing beads with SF and thumb-moderate difficulty   PROM/Stretching:               Scar Mass/Edema Control:     Scar Tissue Manipulation x Over the volar/ulnar wrist and over the cubital tunnel to reduce scar tissue and increase tissue elasticity        Strengthening:     L Hand/Digital X       -Putty: SF adduction.  Added to HEP.  -manipulation of beads out of putty 10 pennies, extension stretch added into putty this date  -resistive clips-radial moderate green clip to  30 poms poms   Forearm       -red 10# therabar 15 reps supination and pronation  -wrist flexion and extension with 2# dumbbell 10 reps   Other:     HEP x Reviewed HEP previously given as well as added abduction exercises with cotton balls. Provided with sponges and marble 11/1. Stereoagnosis  Finding objects in beans with vision occluded to find      Assessment/Comments: Pt is making Fair + progress toward stated plan of care. Pt tolerated session well with noted increased adduction of the SF to RF this date with functional activities. Pt reports decreased  and wrist strength-would be unable to hold and utilize a heavy hand held tool. To focus on gross strength of the LUE next session with wrist and  strengthening activities. -Rehab Potential: Good  -Requires OT Follow Up: Yes    Time In: 8:10a            Time Out: 8:55a             Visit #:6  Treatment Charges: Mins Units   Modalities-fluido 10 1   Ther Exercise 20 1   Manual Therapy 15 1   Thera Activities     ADL/Home Mgt      Neuro Re-education     Gait Training     Group Therapy     Non-Billable Service Time     Other     Total Time/Units 45 3     -Response to Treatment: Reports increased tingling/numbness with ulnar nerve gliding. Plan:   [x]  Continues Plan of care: Treatment covered based on POC and graduated to patient's progress. Pt education continues at each visit to obtain maximum benefits from skilled OT intervention. []  Alter Plan of care:   []  Discharge:       [facility], OT R/L, [address], #[id number]

## 2021-11-17 LAB
SEX HORMONE BINDING GLOBULIN: 100 NMOL/L (ref 11–80)
TESTOSTERONE FREE-NONMALE: 73.2 PG/ML (ref 47–244)
TESTOSTERONE TOTAL: 755 NG/DL (ref 220–1000)

## 2021-11-18 ENCOUNTER — TREATMENT (OUTPATIENT)
Dept: OCCUPATIONAL THERAPY | Age: 71
End: 2021-11-18
Payer: MEDICARE

## 2021-11-18 DIAGNOSIS — R20.2 TINGLING SENSATION: ICD-10-CM

## 2021-11-18 DIAGNOSIS — R20.0 NUMBNESS AND TINGLING: Primary | ICD-10-CM

## 2021-11-18 DIAGNOSIS — R20.2 NUMBNESS AND TINGLING: Primary | ICD-10-CM

## 2021-11-18 PROCEDURE — 97140 MANUAL THERAPY 1/> REGIONS: CPT | Performed by: OCCUPATIONAL THERAPIST

## 2021-11-18 PROCEDURE — 97530 THERAPEUTIC ACTIVITIES: CPT | Performed by: OCCUPATIONAL THERAPIST

## 2021-11-18 PROCEDURE — 97110 THERAPEUTIC EXERCISES: CPT | Performed by: OCCUPATIONAL THERAPIST

## 2021-11-18 NOTE — PROGRESS NOTES
OCCUPATIONAL THERAPY PROGRESS NOTE    Date:  2021  Initial Evaluation Date: 10/25/2021      Patient Name:  Jemal Haque    :  1950    Restrictions/Precautions:  none, low fall risk  Diagnosis:  L Ulnar Nerve Decompression; R20.0, R20.2 (ICD-10-CM) - Numbness and tingling in both hands    Date of Surgery/Injury: 2021  ( 10 weeks post op)     Insurance/Certification information:  [facility] Medicare  Plan of care signed (Y/N): Y - cosigned  Visit# / total visits:       Referring Practitioner:  Dona Sethi MD  Specific Practitioner Orders: Scar desensitization and management, strengthening as tolerated, modalities prn     Assessment of current deficits   [x]? ADLs          [x]? Strength         [x]? IADLs        [x]? Endurance     [x]? FM Coordination     [x]? Sensation             [x]? Scar Adhesion/Skin Integrity      OT PLAN OF CARE   OT POC based on physician orders, patient diagnosis and results of clinical assessment     Frequency/Duration 1-2x a week for 12 visits Certification period From: 10/25/2021  To: 2021    Specific OT Treatment to include:   [x]? Instruction in HEP                   Modalities:  [x]?  Therapeutic Exercise              [x]? Ultrasound                 [x]? PROM/Stretching                    [x]? Fluidotherapy          [x]?   Paraffin                   [x]? AAROM  [x]?  AROM                  [x]? Desensitization/Sensory Reintegration     [x]?  Tendon/Nerve Glides              [x]? ADL/IADL re-training    [x]?  Therapeutic Activity                 [x]? GM/FM Coordination             [x]? Salli Pean                     [x]? Scar Management                                  Patient Specific Goal: To be able to use a fork and regain strength in his hand                             GOALS (Long term same as Short term):  1) Patient will demonstrate good understanding of home program (exercises/activities/diagnosis/prognosis/goals) with good accuracy.   2) Patient will report ADL functions as Mod I/I using LUE to complete slight resistive ADL tasks-opening bottles for bathing. 3) Patient will demonstrate increased /pinch strength of at least 10 / 5 pinch pounds of their L hand. 4) Increase in fine motor function as evidenced by decreased time to complete 9-hole peg test and/or MRMT test by at least 5-10 seconds. 5) Patient will demonstrate a non-tender/non-adherent scar. 6) Patient will report IADL functions as Mod I/I using LUE to complete slight resistive ADL tasks- opening zip lock bags for meal preparation. 7) Patient will decrease QuickDASH score to 25% or less for increased participation in daily functional activities. Assessed 11/1 - 43%. TODAY'S TREATMENT     Pain Level: no pain    Subjective: Pt reported \"My arm - hand is so weak - can I work that now? \"     Objective:  Updated POC to be completed by 10th session or 11/25/2021. INTERVENTION: COMPLETED: SPECIFICS/COMMENTS:   Modality:     Fluidotherapy  10 min for sensory reintegration (finding various graded objects and identifying texture/size/shape) and AROM of the digits and wrist.   MHP x Elbow and hand this date to warn up tissue and during stretching of the wrist.    AROM/AAROM:     L Wrist AROM  All planes   L UE Nerve Gliding  -x1 technique ulnar nerve gliding to reduce numbness and tingling   L SF AROM x         x -opposition to SF with pennies to place in container  -Adducting marbles between RF/SF to strengthen SF add  - SF and IF abduction and adduction - harder to abd- used place and hold. Tried 18 rubber band for abd but too hard 10 reps'    L Digital AROM x                -All planes, abduction, hook fist and full composite fist. Duck bill too hard 2* to weak lumbricals - tried with place and hold 10 rep's - RF could do it not SF>  -towel scrunches  -Digital abd/add x 15 reps. Added to HEP.   -Composite fist with blue sponges between each digit x 15 reps to stretch palm of - RF  - P+                         SF  - P-    -Rehab Potential: Good  -Requires OT Follow Up: Yes    Time In: 8:10a            Time Out:  9:15 a             Visit #:  7  Treatment Charges: Mins Units   Modalities-fluido     Ther Exercise 30 2   Manual Therapy 15 1   Thera Activities 15 1   ADL/Home Mgt      Neuro Re-education     Gait Training     Group Therapy     Non-Billable Service Time MHP 5 0   Other     Total Time/Units 60 4     -Response to Treatment: Reports fatigue after strengthening but it felt good. Plan:   [x]  Continues Plan of care: Treatment covered based on POC and graduated to patient's progress. Pt education continues at each visit to obtain maximum benefits from skilled OT intervention.   []  Alter Plan of care:   []  Discharge:      Nikko Ratliff OT/L, CHT

## 2021-11-22 ENCOUNTER — TREATMENT (OUTPATIENT)
Dept: OCCUPATIONAL THERAPY | Age: 71
End: 2021-11-22
Payer: MEDICARE

## 2021-11-22 DIAGNOSIS — R20.0 NUMBNESS AND TINGLING: Primary | ICD-10-CM

## 2021-11-22 DIAGNOSIS — R20.2 TINGLING SENSATION: ICD-10-CM

## 2021-11-22 DIAGNOSIS — R20.2 NUMBNESS AND TINGLING: Primary | ICD-10-CM

## 2021-11-22 PROCEDURE — 97530 THERAPEUTIC ACTIVITIES: CPT | Performed by: OCCUPATIONAL THERAPIST

## 2021-11-22 PROCEDURE — 97022 WHIRLPOOL THERAPY: CPT | Performed by: OCCUPATIONAL THERAPIST

## 2021-11-22 PROCEDURE — 97110 THERAPEUTIC EXERCISES: CPT | Performed by: OCCUPATIONAL THERAPIST

## 2021-11-22 NOTE — PROGRESS NOTES
OCCUPATIONAL THERAPY PROGRESS NOTE  RE-ASSESSMENT    Date:  2021  Initial Evaluation Date: 10/25/2021      Patient Name:  Missy Cheng    :  1950    Restrictions/Precautions:  none, low fall risk  Diagnosis:  L Ulnar Nerve Decompression; R20.0, R20.2 (ICD-10-CM) - Numbness and tingling in both hands    Date of Surgery/Injury: 2021  ( 10 weeks post op)     Insurance/Certification information:  [facility] Medicare  Plan of care signed (Y/N): Y - cosigned  Visit# / total visits:      Referring Practitioner:  Gabe Holly MD  Specific Practitioner Orders: Scar desensitization and management, strengthening as tolerated, modalities prn     Assessment of current deficits   [x]? ADLs          [x]? Strength         [x]? IADLs        [x]? Endurance     [x]? FM Coordination     [x]? Sensation  [x]? Scar Adhesion/Skin Integrity      OT PLAN OF CARE   OT POC based on physician orders, patient diagnosis and results of clinical assessment     Frequency/Duration 1-2x a week for 12 visits Certification period From: 10/25/2021  To: 2021    Specific OT Treatment to include:   [x]? Instruction in HEP                   Modalities:  [x]?  Therapeutic Exercise              [x]? Ultrasound                 [x]? PROM/Stretching                    [x]? Fluidotherapy          [x]?   Paraffin                   [x]? AAROM  [x]?  AROM                  [x]? Desensitization/Sensory Reintegration     [x]?  Tendon/Nerve Glides              [x]? ADL/IADL re-training    [x]?  Therapeutic Activity                 [x]? GM/FM Coordination             [x]? Pelon Miners                     [x]?  Scar Management                                  Patient Specific Goal: To be able to use a fork and regain strength in his hand - Strength regaining, continues to have difficulty manipulating fork                             GOALS (Long term same as Short term):  1) Patient will demonstrate good understanding of home program (exercises/activities/diagnosis/prognosis/goals) with good accuracy. Progressing well, in-depth review of HEP with good compliance and understanding. 2) Patient will report ADL functions as Mod I/I using LUE to complete slight resistive ADL tasks-opening bottles for bathing. Progressing well. 3) Patient will demonstrate increased /pinch strength of at least 10 / 5 pinch pounds of their L hand. Progressing well, slow with lateral pinch. 4) Increase in fine motor function as evidenced by decreased time to complete 9-hole peg test and/or MRMT test by at least 5-10 seconds. Slow progression. 5) Patient will demonstrate a non-tender/non-adherent scar. Goal met. 6) Patient will report IADL functions as Mod I/I using LUE to complete slight resistive ADL tasks- opening zip lock bags for meal preparation. Progressing well. 7) Patient will decrease QuickDASH score to 25% or less for increased participation in daily functional activities. Progressing well- 43% to 15%. TODAY'S TREATMENT     Pain Level: no pain    Subjective: Pt reported \"It's hard for me to isolate movements in this hand. \"     Objective:  Updated POC completed on 11/22/2021. INTERVENTION: COMPLETED: SPECIFICS/COMMENTS:   Modality:     Fluidotherapy x 10 min for sensory reintegration (finding various graded objects and identifying texture/size/shape) and AROM of the digits and wrist.   MHP  Elbow and hand this date to warn up tissue and during stretching of the wrist.    AROM/AAROM:     L Wrist AROM  All planes   L UE Nerve Gliding  -x1 technique ulnar nerve gliding to reduce numbness and tingling   L SF AROM          x -opposition to SF with pennies to place in container  -Adducting marbles between RF/SF to strengthen SF add  - SF and IF abduction and adduction - harder to abd- used place and hold.  Tried 18 rubber band for abd but too hard 10 reps'    L Digital AROM x                -All planes, abduction, hook fist and full composite fist. Duck bill too hard 2* to weak lumbricals - tried with place and hold 10 rep's - RF could do it not SF --> Oval * splints provided to isolate and better strengthen motion.  -towel scrunches  -Digital abd/add x 15 reps. Added to HEP. -Composite fist with blue sponges between each digit x 15 reps to stretch palm of hand  -opposition alternating stringing beads with SF and thumb-moderate difficulty   PROM/Stretching:     R wrist and elbow  x With hot packs on        Scar Mass/Edema Control:     Scar Tissue Manipulation  Over the volar/ulnar wrist and over the cubital tunnel to reduce scar tissue and increase tissue elasticity        Strengthening:     L Hand/Digital           X  X    X  -Putty: SF adduction. Added to HEP.  -manipulation of beads out of putty 10 pennies, extension stretch   -resistive clips-radial moderate green clip to  30 poms poms  -Yellow Extender: 15 reps w/ 3 sec holds  -Hand Gripper: 30 reps w/ 3 sec holds at level 3 resistance with black coil  -Intrinsic + Strengthening: 15 reps with red sponge and oval 8 splints on RF/SF to better isolate muscles. Forearm                X  -red 10# therabar 15 reps supination and pronation  -wrist flexion,  Extension, RD- UD and forearm rotation with 2# dumbbell ^ to 15 reps  - 3# biceps curl regular and hammer head- 15 rep's ea  -Mr. : 3# up/don = 1 rep for 6 reps. Physical cueing to keep L elbow in. Other:     Ulnar nerve test   Done this date with pt given education and a copy of the sheet. See below. Splinting for Exercise x Size 9 oval 8 splint for SF and size 11 for RF to block PIPs from flexing when attempting to isolate MCP flexion/extension. HEP x Reviewed HEP previously given as well as added abduction exercises with cotton balls. Provided with sponges and marble 11/1.    Stereoagnosis  Finding objects in beans with vision occluded to find      Assessment/Comments: Pt is making Fair + progress toward stated plan of care. Re-assessment completed with new measurements shown below in BOLD. Excellent decreases in sensation of the hand. Good gains in  and pinch strength with exception of lateral pinch. Slow improvements noted with FM skills with improved ability for in-hand manipulation. Pt able to adduct SF about 1/2 range. Unable to perform intrinsic + position with SF/RF d/t PIP flexion compensating. Therapist issued oval 8 splints for RF/SF t use during intrinsic + tendon gliding exercise to strengthen and isolate the intrinsics of the hand. To perform 25 reps - then 15 reps with resistance against sponge to follow 1-2x/day for HEP. Pt continues to perform yellow putty and foam block exercises 1x/day for daily strengthening. Pt reporting decreased difficulty with daily chores and with resistive use of the L hand - but finer tasks like manipulating a utensil in the hand still remain challenging. Pt remains very motivated and in tuned to learning about diagnosis. Overall, pt progressing well toward goals but will benefit from continued skilled OT to further address global and isolated strengthening of the L hand to enhance performance in daily functional tasks. RE-ASSESSMENT 11/22/2021:  Sensation: numbness and tingling throughout the hand but pt states he has no sensation in his SF on his L hand.  To assess in following sessions. -->Decreased to SF only.     Dynamometer (setting 2):                              Left: 45# to 60#                                               Right: 75# to 85#                     Pinch Meter:              Lateral: Left= 7# to 8#, Right= 14#               Palmar 3 point: Left= 8# to to 15#, Right= 12#  9 Hole Peg Test:              Left: 30 seconds to 28 sec              Right: 22 seconds     Ulnar N Assessment:   FCR   G-  FDP  IF and MF  Median nerve G            RF  F-            SF  P  Adductor Pollicis - P  FPB   F_  Palmar Interossei  (adduction)  P  Adductor digiti minimi  - F  Opponens digit minimi  - P  Dorsal Interossei-  (abduction)  F  3rd and 4th   - RF  - P+                         SF  - P-    -Rehab Potential: Good  -Requires OT Follow Up: Yes    Time In: 9:05 a            Time Out:  10:00 a           Visit #: 8  Treatment Charges: Mins Units   Modalities-fluido 15 1   Ther Exercise 25 2   Manual Therapy     Thera Activities 15 1   ADL/Home Mgt      Neuro Re-education     Gait Training     Group Therapy     Non-Billable Service Time MHP     Other     Total Time/Units 55 4     -Response to Treatment: Pleased with variety in strengthening. Plan:   [x]  Continues Plan of care: Treatment covered based on POC and graduated to patient's progress. Pt education continues at each visit to obtain maximum benefits from skilled OT intervention.   []  Alter Plan of care:   []  Discharge:      Dinora Parra OTR/L, [address], #[id number]

## 2021-11-24 ENCOUNTER — TREATMENT (OUTPATIENT)
Dept: OCCUPATIONAL THERAPY | Age: 71
End: 2021-11-24
Payer: MEDICARE

## 2021-11-24 DIAGNOSIS — R20.0 NUMBNESS AND TINGLING: Primary | ICD-10-CM

## 2021-11-24 DIAGNOSIS — R20.2 TINGLING SENSATION: ICD-10-CM

## 2021-11-24 DIAGNOSIS — R20.2 NUMBNESS AND TINGLING: Primary | ICD-10-CM

## 2021-11-24 PROCEDURE — 97110 THERAPEUTIC EXERCISES: CPT | Performed by: OCCUPATIONAL THERAPIST

## 2021-11-24 NOTE — PROGRESS NOTES
OCCUPATIONAL THERAPY PROGRESS NOTE    Date:  2021  Initial Evaluation Date: 10/25/2021      Patient Name:  Hillary Garcia    :  1950    Restrictions/Precautions:  none, low fall risk  Diagnosis:  L Ulnar Nerve Decompression; R20.0, R20.2 (ICD-10-CM) - Numbness and tingling in both hands    Date of Surgery/Injury: 2021  ( 10 weeks post op)     Insurance/Certification information:  [facility] Medicare  Plan of care signed (Y/N): Y - cosigned; Re-assessment completed   Visit# / total visits:       Referring Practitioner:  Babatunde Elena MD  Specific Practitioner Orders: Scar desensitization and management, strengthening as tolerated, modalities prn     Assessment of current deficits   [x]? ADLs          [x]? Strength         [x]? IADLs        [x]? Endurance     [x]? FM Coordination     [x]? Sensation  [x]? Scar Adhesion/Skin Integrity      OT PLAN OF CARE   OT POC based on physician orders, patient diagnosis and results of clinical assessment     Frequency/Duration 1-2x a week for 12 visits Certification period From: 10/25/2021  To: 2021    Specific OT Treatment to include:   [x]? Instruction in HEP                   Modalities:  [x]?  Therapeutic Exercise              [x]? Ultrasound                 [x]? PROM/Stretching                    [x]? Fluidotherapy          [x]?   Paraffin                   [x]? AAROM  [x]?  AROM                  [x]? Desensitization/Sensory Reintegration     [x]?  Tendon/Nerve Glides              [x]? ADL/IADL re-training    [x]?  Therapeutic Activity                 [x]? GM/FM Coordination             [x]? Farhana Fent                     [x]?  Scar Management                                  Patient Specific Goal: To be able to use a fork and regain strength in his hand - Strength regaining, continues to have difficulty manipulating fork                             GOALS (Long term same as Short term):  1) Patient will demonstrate good understanding of home composite fist. Duck bill too hard 2* to weak lumbricals - tried with place and hold 10 rep's - RF could do it not SF --> Oval * splints provided to isolate and better strengthen motion.  -towel scrunches  -Digital abd/add x 15 reps. Added to HEP. -Composite fist with blue sponges between each digit x 15 reps to stretch palm of hand  -opposition alternating stringing beads with SF and thumb-moderate difficulty   PROM/Stretching:     R wrist and elbow   With hot packs on        Scar Mass/Edema Control:     Scar Tissue Manipulation  Over the volar/ulnar wrist and over the cubital tunnel to reduce scar tissue and increase tissue elasticity        Strengthening:     L Hand/Digital           X  X    X  -Putty: SF adduction. Added to HEP.  -manipulation of beads out of putty 10 pennies, extension stretch   -resistive clips-radial moderate green clip to  30 poms poms  -Yellow Extender:^'d to 20 reps w/ 3 sec holds  -Hand Gripper: 30 reps w/ 3 sec holds at level 3 resistance with black coil  -Intrinsic + Strengthening: 15 reps with red sponge and oval 8 splints on RF/SF to better isolate muscles. Forearm  X           X       X     X  -Advanced to 15# therabar 15 reps supination, pronation, and twisting  -wrist flexion,  Extension, RD- UD and forearm rotation with 2# dumbbell ^ to 15 reps  - Advanced to 5# biceps curl 2x15 underhand and overhand. Tricep skull : 2x15.  -Mr. : 3# up/don = 1 rep for ^'d to 8 reps. Physical cueing to keep L elbow in.  -3.3# Jearld Awe Grasps with Wrist flex/ext x 20 reps. Min difficulty d/t fatigue in SF and thumb   Other:     Ulnar nerve test   Done this date with pt given education and a copy of the sheet. See below. Splinting for Exercise Continue daily exercise Size 9 oval 8 splint for SF and size 11 for RF to block PIPs from flexing when attempting to isolate MCP flexion/extension.    HEP Daily Reviewed HEP previously given as well as added abduction exercises with cotton balls. Provided with sponges and marble 11/1. Stereoagnosis  Finding objects in beans with vision occluded to find      Assessment/Comments: Pt is making Fair + progress toward stated plan of care. Pt arrived 15 min late to therapy session. D/t shortened session, today focused strictly on strengthening with advances in resistance and reps made today with good tolerance. He did have moderate fatigue in the ulnar side of hand by end of session, especially following wide grasping exercise. Pt appreciating gross strengthening to strength ulnar nerve connection to the hand. Will continue to progress with strengthening as tolerated. RE-ASSESSMENT 11/22/2021:  Sensation: numbness and tingling throughout the hand but pt states he has no sensation in his SF on his L hand.  To assess in following sessions. -->Decreased to SF only.     Dynamometer (setting 2):                              Left: 45# to 60#                                               Right: 75# to 85#                     Pinch Meter:              Lateral: Left= 7# to 8#, Right= 14#               Palmar 3 point: Left= 8# to to 15#, Right= 12#  9 Hole Peg Test:              Left: 30 seconds to 28 sec              Right: 22 seconds     Ulnar N Assessment:   FCR   G-  FDP  IF and MF  Median nerve G            RF  F-            SF  P  Adductor Pollicis - P  FPB   F_  Palmar Interossei  (adduction)  P  Adductor digiti minimi  - F  Opponens digit minimi  - P  Dorsal Interossei-  (abduction)  F  3rd and 4th   - RF  - P+                         SF  - P-    -Rehab Potential: Good  -Requires OT Follow Up: Yes    Time In: 9:20 a            Time Out:  10:00 a           Visit #: 9  Treatment Charges: Mins Units   Modalities-fluido     Ther Exercise 40 3   Manual Therapy     Thera Activities     ADL/Home Mgt      Neuro Re-education     Gait Training     Group Therapy     Non-Billable Service Time MHP     Other     Total Time/Units 40 3     -Response to

## 2021-11-29 ENCOUNTER — TREATMENT (OUTPATIENT)
Dept: OCCUPATIONAL THERAPY | Age: 71
End: 2021-11-29
Payer: MEDICARE

## 2021-11-29 DIAGNOSIS — R20.0 NUMBNESS AND TINGLING: Primary | ICD-10-CM

## 2021-11-29 DIAGNOSIS — R20.2 NUMBNESS AND TINGLING: Primary | ICD-10-CM

## 2021-11-29 DIAGNOSIS — R20.2 TINGLING SENSATION: ICD-10-CM

## 2021-11-29 PROCEDURE — 97018 PARAFFIN BATH THERAPY: CPT | Performed by: OCCUPATIONAL THERAPIST

## 2021-11-29 PROCEDURE — 97110 THERAPEUTIC EXERCISES: CPT | Performed by: OCCUPATIONAL THERAPIST

## 2021-11-29 NOTE — PROGRESS NOTES
OCCUPATIONAL THERAPY PROGRESS NOTE    Date:  2021  Initial Evaluation Date: 10/25/2021      Patient Name:  Yosi Perry    :  1950    Restrictions/Precautions:  none, low fall risk  Diagnosis:  L Ulnar Nerve Decompression; R20.0, R20.2 (ICD-10-CM) - Numbness and tingling in both hands    Date of Surgery/Injury: 2021  ( 10 weeks post op)     Insurance/Certification information:  [facility] Medicare  Plan of care signed (Y/N): Y - cosigned; Re-assessment completed   Visit# / total visits: 10  / 12      Referring Practitioner:  Niurka Pagan MD  Specific Practitioner Orders: Scar desensitization and management, strengthening as tolerated, modalities prn     Assessment of current deficits   [x]? ADLs          [x]? Strength         [x]? IADLs        [x]? Endurance     [x]? FM Coordination     [x]? Sensation  [x]? Scar Adhesion/Skin Integrity      OT PLAN OF CARE   OT POC based on physician orders, patient diagnosis and results of clinical assessment     Frequency/Duration 1-2x a week for 12 visits Certification period From: 10/25/2021  To: 2021    Specific OT Treatment to include:   [x]? Instruction in HEP                   Modalities:  [x]?  Therapeutic Exercise              [x]? Ultrasound                 [x]? PROM/Stretching                    [x]? Fluidotherapy          [x]?   Paraffin                   [x]? AAROM  [x]?  AROM                  [x]? Desensitization/Sensory Reintegration     [x]?  Tendon/Nerve Glides              [x]? ADL/IADL re-training    [x]?  Therapeutic Activity                 [x]? GM/FM Coordination             [x]? Luisa Dry                     [x]?  Scar Management                                  Patient Specific Goal: To be able to use a fork and regain strength in his hand - Strength regaining, continues to have difficulty manipulating fork                             GOALS (Long term same as Short term):  1) Patient will demonstrate good understanding of home program (exercises/activities/diagnosis/prognosis/goals) with good accuracy. Progressing well, in-depth review of HEP with good compliance and understanding. 2) Patient will report ADL functions as Mod I/I using LUE to complete slight resistive ADL tasks-opening bottles for bathing. Progressing well. 3) Patient will demonstrate increased /pinch strength of at least 10 / 5 pinch pounds of their L hand. Progressing well, slow with lateral pinch. 4) Increase in fine motor function as evidenced by decreased time to complete 9-hole peg test and/or MRMT test by at least 5-10 seconds. Slow progression. 5) Patient will demonstrate a non-tender/non-adherent scar. Goal met. 6) Patient will report IADL functions as Mod I/I using LUE to complete slight resistive ADL tasks- opening zip lock bags for meal preparation. Progressing well. 7) Patient will decrease QuickDASH score to 25% or less for increased participation in daily functional activities. Progressing well- 43% to 15%. TODAY'S TREATMENT     Pain Level: no pain    Subjective: Pt reported \"Some days this finger works better than other days. \"     Objective:  Updated POC completed on 11/22/2021. INTERVENTION: COMPLETED: SPECIFICS/COMMENTS:   Modality:     Fluidotherapy  10 min for sensory reintegration (finding various graded objects and identifying texture/size/shape) and AROM of the digits and wrist.   Paraffin x 10 min for sensory reintegration and to increase soft tissue elasticity    MHP  Elbow and hand this date to warn up tissue and during stretching of the wrist.    AROM/AAROM:     L Wrist AROM  All planes   L UE Nerve Gliding  -x1 technique ulnar nerve gliding to reduce numbness and tingling   L SF AROM           -opposition to SF with pennies to place in container  -Adducting marbles between RF/SF to strengthen SF add  - SF and IF abduction and adduction - harder to abd- used place and hold.  Tried 18 rubber band for abd but too hard 10 reps'    L Digital AROM X                X   X  -All planes, abduction, hook fist and full composite fist. Duck bill too hard 2* to weak lumbricals - tried with place and hold 10 rep's - RF could do it not SF --> Oval * splints provided to isolate and better strengthen motion.  -towel scrunches  -Digital abd/add x 15 reps SF only. -Tendon gliding from hook fist to flat fist with marker, max difficulty x 10 reps  -Composite fist with blue sponges between each digit x 15 reps to stretch palm of hand  -opposition alternating stringing beads with SF and thumb-moderate difficulty   PROM/Stretching:     R wrist and elbow   With hot packs on        Scar Mass/Edema Control:     Scar Tissue Manipulation  Over the volar/ulnar wrist and over the cubital tunnel to reduce scar tissue and increase tissue elasticity        Strengthening:     L Hand/Digital       X       X       X       X  -Putty: SF adduction. Added to HEP.  -manipulation of beads out of putty 10 pennies, extension stretch   -resistive clips-red 3-pnt pinch with RF/SF x 30 with milind strap  -Yellow Extender:^'d to 20 reps w/ 3 sec holds  -Hand Gripper: 30 reps w/ 3 sec holds at level 3 resistance with black coil  -Intrinsic + Strengthening: 15 reps with red sponge and milind strapping on RF/SF to better isolate muscles. -Digital extension x 15 reps with rubber band (max difficulty with RF/SF extension)   Forearm             X           X  -Advanced to 15# therabar 15 reps supination, pronation, and twisting  -wrist flexion,  Extension, RD- UD and forearm rotation with 2# dumbbell ^ to 15 reps  - Advanced to 5# biceps curl 2x15 underhand and overhand. Tricep skull : 2x15.  - : 3# up/don = 1 rep for ^'d to 8 reps. Physical cueing to keep L elbow in.  -3.3# Olivia Paul Grasps with Wrist flex/ext x 25 reps.  Min difficulty d/t fatigue in SF and thumb   Other:     Ulnar nerve test   Done this date with pt given education and a copy of the sheet. See below. Splinting for Exercise Continue daily exercise Size 9 oval 8 splint for SF and size 11 for RF to block PIPs from flexing when attempting to isolate MCP flexion/extension. HEP Daily Reviewed HEP previously given as well as added abduction exercises with cotton balls. Provided with sponges and marble 11/1. Stereoagnosis  Finding objects in beans with vision occluded to find      Assessment/Comments: Pt is making Fair + progress toward stated plan of care. Pt demonstrated max difficulty today with new exercises that included inability to pull into hook fist with tendon gliding marker exercise and difficulty extended RF/SF against rubber band resistance. These ex's were added to his HEP. [employer] used throuhghout session for SF adduction retraining. Pt demonstrated good adductive pull thru of the SF with first 4 isolated reps - then finger fatigued quickly. Will continue to progress toward strengthening of the hand as tolerated. RE-ASSESSMENT 11/22/2021:  Sensation: numbness and tingling throughout the hand but pt states he has no sensation in his SF on his L hand.  To assess in following sessions. -->Decreased to SF only.     Dynamometer (setting 2):                              Left: 45# to 60#                                               Right: 75# to 85#                     Pinch Meter:              Lateral: Left= 7# to 8#, Right= 14#               Palmar 3 point: Left= 8# to 15#, Right= 12#  9 Hole Peg Test:              Left: 30 seconds to 28 sec              Right: 22 seconds     Ulnar N Assessment:   FCR   G-  FDP  IF and MF  Median nerve G            RF  F-            SF  P  Adductor Pollicis - P  FPB   F_  Palmar Interossei  (adduction)  P  Adductor digiti minimi  - F  Opponens digit minimi  - P  Dorsal Interossei-  (abduction)  F  3rd and 4th   - RF  - P+                         SF  - P-    -Rehab Potential: Good  -Requires OT Follow Up: Yes    Time In: 9:05 a

## 2021-11-30 ENCOUNTER — OFFICE VISIT (OUTPATIENT)
Dept: ORTHOPEDIC SURGERY | Age: 71
End: 2021-11-30

## 2021-11-30 VITALS — RESPIRATION RATE: 20 BRPM | HEIGHT: 69 IN | WEIGHT: 160 LBS | BODY MASS INDEX: 23.7 KG/M2

## 2021-11-30 DIAGNOSIS — R20.0 NUMBNESS AND TINGLING IN BOTH HANDS: Primary | ICD-10-CM

## 2021-11-30 DIAGNOSIS — R20.2 NUMBNESS AND TINGLING IN BOTH HANDS: Primary | ICD-10-CM

## 2021-11-30 PROCEDURE — 99024 POSTOP FOLLOW-UP VISIT: CPT | Performed by: ORTHOPAEDIC SURGERY

## 2021-11-30 NOTE — PROGRESS NOTES
11 weeks out left revision of decompression at elbow with excision of neuroma and nerve wrapping and revision submuscular transposition with supercharged end to side AIN to ulnar motor. Overall he is pleased with the results. He relates that he feels as intrinsic function is improving. However he still having numbness in the little finger. Physical exam: Elbow and wrist scars are maturing nicely. He does have full digital flexion extension. He does have improving intrinsic function but has diminished sensation in the little finger to light touch. Full digital flexion extension. Remains with a positive Wartenberg's. Remains with wasting of the intrinsic muscles. Assessment 11 weeks postop revision ulnar nerve submuscular transposition with neurolysis and neuroma excision and nerve wrapping at elbow with SETS nerve transfer AIN to ulnar motor distally    Plan    Today's findings were explained. He reports that overall he is pleased with his progress. Scars are maturing nicely. Discussed follow-up in 3 to 4 months for reevaluation. Discussed briefly obtaining a repeat EMG nerve conduction study if the remains with continued symptomatology on the left side. He voiced understanding. All questions answered.   His symptoms are confounded by C6-C8 radiculopathy as well as continued bilateral hand weakness and numbness

## 2021-12-03 ENCOUNTER — TREATMENT (OUTPATIENT)
Dept: OCCUPATIONAL THERAPY | Age: 71
End: 2021-12-03
Payer: MEDICARE

## 2021-12-03 DIAGNOSIS — R20.0 NUMBNESS AND TINGLING: Primary | ICD-10-CM

## 2021-12-03 DIAGNOSIS — R20.2 TINGLING SENSATION: ICD-10-CM

## 2021-12-03 DIAGNOSIS — R20.2 NUMBNESS AND TINGLING: Primary | ICD-10-CM

## 2021-12-03 PROCEDURE — 97018 PARAFFIN BATH THERAPY: CPT | Performed by: OCCUPATIONAL THERAPIST

## 2021-12-03 PROCEDURE — 97110 THERAPEUTIC EXERCISES: CPT | Performed by: OCCUPATIONAL THERAPIST

## 2021-12-03 NOTE — PROGRESS NOTES
OCCUPATIONAL THERAPY PROGRESS NOTE    Date:  12/3/2021  Initial Evaluation Date: 10/25/2021      Patient Name:  Anitra Spence    :  1950    Restrictions/Precautions:  none, low fall risk  Diagnosis:  L Ulnar Nerve Decompression; R20.0, R20.2 (ICD-10-CM) - Numbness and tingling in both hands    Date of Surgery/Injury: 2021  ( 10 weeks post op)     Insurance/Certification information:  [facility] Medicare  Plan of care signed (Y/N): Y - cosigned; Re-assessment completed   Visit# / total visits:       Referring Practitioner:  Deena Palmer MD  Specific Practitioner Orders: Scar desensitization and management, strengthening as tolerated, modalities prn     Assessment of current deficits   [x]? ADLs          [x]? Strength         [x]? IADLs        [x]? Endurance     [x]? FM Coordination     [x]? Sensation  [x]? Scar Adhesion/Skin Integrity      OT PLAN OF CARE   OT POC based on physician orders, patient diagnosis and results of clinical assessment     Frequency/Duration 1-2x a week for 12 visits Certification period From: 10/25/2021  To: 2021    Specific OT Treatment to include:   [x]? Instruction in HEP                   Modalities:  [x]?  Therapeutic Exercise              [x]? Ultrasound                 [x]? PROM/Stretching                    [x]? Fluidotherapy          [x]?   Paraffin                   [x]? AAROM  [x]?  AROM                  [x]? Desensitization/Sensory Reintegration     [x]?  Tendon/Nerve Glides              [x]? ADL/IADL re-training    [x]?  Therapeutic Activity                 [x]? GM/FM Coordination             [x]? Bobby Carreno                     [x]?  Scar Management                                  Patient Specific Goal: To be able to use a fork and regain strength in his hand - Strength regaining, continues to have difficulty manipulating fork                             GOALS (Long term same as Short term):  1) Patient will demonstrate good understanding of home program (exercises/activities/diagnosis/prognosis/goals) with good accuracy. Progressing well, in-depth review of HEP with good compliance and understanding. 2) Patient will report ADL functions as Mod I/I using LUE to complete slight resistive ADL tasks-opening bottles for bathing. Progressing well. 3) Patient will demonstrate increased /pinch strength of at least 10 / 5 pinch pounds of their L hand. Progressing well, slow with lateral pinch. 4) Increase in fine motor function as evidenced by decreased time to complete 9-hole peg test and/or MRMT test by at least 5-10 seconds. Slow progression. 5) Patient will demonstrate a non-tender/non-adherent scar. Goal met. 6) Patient will report IADL functions as Mod I/I using LUE to complete slight resistive ADL tasks- opening zip lock bags for meal preparation. Progressing well. 7) Patient will decrease QuickDASH score to 25% or less for increased participation in daily functional activities. Progressing well- 43% to 15%. TODAY'S TREATMENT     Pain Level: no pain    Subjective: Pt reported \"the doctor said to follow up in 3 months. \"     Objective:  Updated POC completed on 11/22/2021. INTERVENTION: COMPLETED: SPECIFICS/COMMENTS:   Modality:     Fluidotherapy  10 min for sensory reintegration (finding various graded objects and identifying texture/size/shape) and AROM of the digits and wrist.   Paraffin x 10 min for sensory reintegration and to increase soft tissue elasticity    MHP  Elbow and hand this date to warn up tissue and during stretching of the wrist.    AROM/AAROM:     L Wrist AROM  All planes   L UE Nerve Gliding  -x1 technique ulnar nerve gliding to reduce numbness and tingling   L SF AROM x       x  x -opposition to SF with pennies to place in container  -Adducting marbles between RF/SF to strengthen SF add  - SF and IF abduction and adduction - harder to abd- used place and hold.  Tried 18 rubber band for abd but too hard 10 reps'    L Digital AROM X                X   X  -All planes, abduction, hook fist and full composite fist. Duck bill too hard 2* to weak lumbricals - tried with place and hold 10 rep's - RF could do it not SF --> Oval * splints provided to isolate and better strengthen motion.  -towel scrunches  -Digital abd/add x 15 reps SF only. -Tendon gliding from hook fist to flat fist with marker, max difficulty x 10 reps  -Composite fist with blue sponges between each digit x 15 reps to stretch palm of hand  -opposition alternating stringing beads with SF and thumb-moderate difficulty   PROM/Stretching:     R wrist and elbow   With hot packs on        Scar Mass/Edema Control:     Scar Tissue Manipulation  Over the volar/ulnar wrist and over the cubital tunnel to reduce scar tissue and increase tissue elasticity        Strengthening:     L Hand/Digital       X       X       X        -Putty: SF adduction. Added to HEP.  -manipulation of beads out of putty 10 pennies, extension stretch   -resistive clips-red 3-pnt pinch with RF/SF x 30 with milind strap  -Yellow Extender:^'d to 20 reps w/ 3 sec holds  -Hand Gripper: 30 reps w/ 3 sec holds at level 3 resistance with black coil  -Intrinsic + Strengthening: 15 reps with red sponge and milind strapping on RF/SF to better isolate muscles. -Digital extension x 15 reps with rubber band (max difficulty with RF/SF extension)   Forearm             X           X  -Advanced to 15# therabar 15 reps supination, pronation, and twisting  -wrist flexion,  Extension, RD- UD and forearm rotation with 2# dumbbell ^ to 15 reps  - Advanced to 5# biceps curl 2x15 underhand and overhand. Tricep skull : 2x15.  -. : 3# up/don = 1 rep for ^'d to 8 reps. Physical cueing to keep L elbow in.  -3.3# Porfirio Pole Grasps with Wrist flex/ext x 25 reps. Min difficulty d/t fatigue in SF and thumb   Other:     Ulnar nerve test   Done this date with pt given education and a copy of the sheet. See below. Splinting for Exercise Continue daily exercise Size 9 oval 8 splint for SF and size 11 for RF to block PIPs from flexing when attempting to isolate MCP flexion/extension. HEP Daily Reviewed HEP previously given as well as added abduction exercises with cotton balls. Provided with sponges and marble 11/1. Stereoagnosis  Finding objects in beans with vision occluded to find      Assessment/Comments: Pt is making Fair + progress toward stated plan of care. Pt tolerated session well, continues to demonstrate increased difficulty with fine motor activities. In hand manipulation and opposition tasks this date-increased difficulty reported from previous sessions. Will increase as tolerated. -Rehab Potential: Good  -Requires OT Follow Up: Yes    Time In: 9:05 a            Time Out:  10:00 a           Visit #: 11  Treatment Charges: Mins Units   Modalities-Par 10 1   Ther Exercise 45 3   Manual Therapy     Thera Activities     ADL/Home Mgt      Neuro Re-education     Gait Training     Group Therapy     Non-Billable Service Time MHP     Other     Total Time/Units 55 4     -Response to Treatment: Tolerating all strengthening well, but is fairly fatigued by end of session. Plan:   [x]  Continues Plan of care: Treatment covered based on POC and graduated to patient's progress. Pt education continues at each visit to obtain maximum benefits from skilled OT intervention. []  Alter Plan of care:   []  Discharge:       [facility], OTR/L, Elvia Calderon [address], #[id number]

## 2021-12-07 ENCOUNTER — TREATMENT (OUTPATIENT)
Dept: OCCUPATIONAL THERAPY | Age: 71
End: 2021-12-07
Payer: MEDICARE

## 2021-12-07 DIAGNOSIS — R20.2 NUMBNESS AND TINGLING: Primary | ICD-10-CM

## 2021-12-07 DIAGNOSIS — R20.2 TINGLING SENSATION: ICD-10-CM

## 2021-12-07 DIAGNOSIS — R20.0 NUMBNESS AND TINGLING: Primary | ICD-10-CM

## 2021-12-07 PROCEDURE — 97110 THERAPEUTIC EXERCISES: CPT | Performed by: OCCUPATIONAL THERAPIST

## 2021-12-07 PROCEDURE — 97018 PARAFFIN BATH THERAPY: CPT | Performed by: OCCUPATIONAL THERAPIST

## 2021-12-07 NOTE — PROGRESS NOTES
OCCUPATIONAL THERAPY PROGRESS NOTE    DISCHARGE SUMMARY    Date:  2021  Initial Evaluation Date: 10/25/2021      Patient Name:  Helen Blair    :  1950    Restrictions/Precautions:  none, low fall risk  Diagnosis:  L Ulnar Nerve Decompression; R20.0, R20.2 (ICD-10-CM) - Numbness and tingling in both hands    Date of Surgery/Injury: 2021  ( 13 weeks post op)     Insurance/Certification information:  [facility] Medicare  Plan of care signed (Y/N): Y - cosigned; Re-assessment completed   Visit# / total visits:       Referring Practitioner:  Brady Melendez MD  Specific Practitioner Orders: Scar desensitization and management, strengthening as tolerated, modalities prn     Assessment of current deficits   [x]? ADLs          [x]? Strength         [x]? IADLs        [x]? Endurance     [x]? FM Coordination     [x]? Sensation  [x]? Scar Adhesion/Skin Integrity      OT PLAN OF CARE   OT POC based on physician orders, patient diagnosis and results of clinical assessment     Frequency/Duration 1-2x a week for 12 visits Certification period From: 10/25/2021  To: 2021    Specific OT Treatment to include:   [x]? Instruction in HEP                   Modalities:  [x]?  Therapeutic Exercise              [x]? Ultrasound                 [x]? PROM/Stretching                    [x]? Fluidotherapy          [x]?   Paraffin                   [x]? AAROM  [x]?  AROM                  [x]? Desensitization/Sensory Reintegration     [x]?  Tendon/Nerve Glides              [x]? ADL/IADL re-training    [x]?  Therapeutic Activity                 [x]? GM/FM Coordination             [x]? Anette Zuniga                     [x]?  Scar Management                                  Patient Specific Goal: To be able to use a fork and regain strength in his hand - Strength regaining, continues to have difficulty manipulating fork                             GOALS (Long term same as Short term):  1) Patient will demonstrate good understanding of home program (exercises/activities/diagnosis/prognosis/goals) with good accuracy. Goal Met - showing good compliance and understanding. 2) Patient will report ADL functions as Mod I/I using LUE to complete slight resistive ADL tasks-opening bottles for bathing. Goal Partially met. Intrinsic limitations continue. 3) Patient will demonstrate increased /pinch strength of at least 10 / 5 pinch pounds of their L hand. Goal Met. 4) Increase in fine motor function as evidenced by decreased time to complete 9-hole peg test and/or MRMT test by at least 5-10 seconds. Goal Met. 5) Patient will demonstrate a non-tender/non-adherent scar. Goal met. 6) Patient will report IADL functions as Mod I/I using LUE to complete slight resistive ADL tasks- opening zip lock bags for meal preparation. Goal Partially met. 7) Patient will decrease QuickDASH score to 25% or less for increased participation in daily functional activities. Goal Met. - 43% to 22%. TODAY'S TREATMENT     Pain Level: no pain    Subjective: Pt reported \"I hope this nerve begins to work better - but the DR did say it was really smashed - impinged - when he did the sx.\".\"     Objective:  Updated POC completed on this date.   INTERVENTION: COMPLETED: SPECIFICS/COMMENTS:   Modality:     Fluidotherapy  10 min for sensory reintegration (finding various graded objects and identifying texture/size/shape) and AROM of the digits and wrist.   Paraffin x 10 min for sensory reintegration and to increase soft tissue elasticity    MHP  Elbow and hand this date to warn up tissue and during stretching of the wrist.    AROM/AAROM:     L Wrist AROM  All planes   L UE Nerve Gliding  -x1 technique ulnar nerve gliding to reduce numbness and tingling   L SF AROM          x -opposition to SF with pennies to place in container  -Adducting marbles between RF/SF to strengthen SF add  - SF and IF abduction and adduction - harder to abd- used place and hold. Tried 18 rubber band for abd but too hard 10 reps'    L Digital AROM X                     x     -All planes, abduction, hook fist and full composite fist. Duck bill too hard 2* to weak lumbricals - tried with place and hold 10 rep's - RF could do it not SF --> Oval * splints provided to isolate and better strengthen motion. Did with red sponge -just tap it -  place and hold with SF /RF   -towel scrunches  -Digital abd/add x 15 reps SF only. -Tendon gliding from hook fist to flat fist with marker, max difficulty x 10 reps  -Composite fist with blue sponges between each digit x 15 reps to stretch palm of hand  -opposition alternating stringing beads with SF and thumb-moderate difficulty   PROM/Stretching:     R wrist and elbow   With hot packs on        Scar Mass/Edema Control:     Scar Tissue Manipulation  Over the volar/ulnar wrist and over the cubital tunnel to reduce scar tissue and increase tissue elasticity        Strengthening:     L Hand/Digital x               x            -Putty:manipulation of putty 6 sec and squeeze 3 sec for 3 min, D/C roll and pinch - but did 3 pt pinch with holding putty in other hand - trying not to compensate with thumb flexion.   -resistive clips-red 3-pnt pinch with RF/SF x 30 with milind strap  -Yellow Extender:^'d to 20 reps w/ 3 sec holds  -Hand Gripper: 30 reps w/ 3 sec holds at level 3 resistance with black coil  -Intrinsic + Strengthening: 15 reps with red sponge and milind strapping on RF/SF to better isolate muscles. -Digital extension x 15 reps with rubber band (max difficulty with RF/SF extension)   Forearm                         -Advanced to 15# therabar 15 reps supination, pronation, and twisting  -wrist flexion,  Extension, RD- UD and forearm rotation with 2# dumbbell ^ to 15 reps  - Advanced to 5# biceps curl 2x15 underhand and overhand. Tricep skull : 2x15.  - : 3# up/don = 1 rep for ^'d to 8 reps.  Physical cueing to keep L elbow in. -3.3# Gabbie Barrios Grasps with Wrist flex/ext x 25 reps. Min difficulty d/t fatigue in SF and thumb   Other:     Median nerve assessment  x See below - showed good function. Ulnar nerve test   Done this date with pt given education and a copy of the sheet. See below. Splinting for Exercise Continue daily exercise Size 9 oval 8 splint for SF and size 11 for RF to block PIPs from flexing when attempting to isolate MCP flexion/extension. HEP Daily Reviewed HEP previously given as well as added abduction exercises with cotton balls. Provided with sponges and marble 11/1. Reveiwed all this date. Stereoagnosis  Finding objects in beans with vision occluded to find      Assessment/Comments: Pt is making Fair + progress toward stated plan of care. Pt tolerated session well, continues to demonstrate increased difficulty with fine motor activities in hand manipulation and opposition tasks. Pt shows ^'ed ROM and functional use but has limitations 2* to ulnar nerve impingement and intrinsic weakness. Therapist reveiwed all home exercises which pt appeared to understand. He continues to be motivated to [employer] and continue to work at home.      RE-ASSESSMENT 12/7/2021:    Sensation: numbness and tingling throughout the hand but pt states he has no sensation in his SF on his L hand.-Continued  decreased in  SF only.     Dynamometer (setting 2):   IE   To 12/7/21                           Left: 45# to 60#                                               Right: 75# to 85#                     Pinch Meter:              Lateral: Left= 7# to 8#, Right= 14#               Palmar 3 point: Left= 8# to to 15#, Right= 12# - 14#  9 Hole Peg Test:              Left: 30 seconds to 28 sec              Right: 22 seconds     Ulnar N Assessment:   FCR   G-  FDP  IF and MF  Median nerve G            RF  F-            SF  P  Adductor Pollicis - P  FPB   F_  Palmar Interossei  (adduction)  P  Adductor digiti minimi  - F  Opponens digit minimi  - P  Dorsal Interossei-  (abduction)  F  3rd and 4th   - RF  - P+                         SF  - P-    Median nerve assessment:  Pronator teres-G  FCR- G+  FDS- IF thru RF G+,  SF  G  FDP radial fingers  G+  Pronator quad-  G  APB -G  Opponens pollicis -G+  FLB - G+  Lumbrical (1 and 2) - G,  Ulnar fingers P    -Rehab Potential: Good  -Requires OT Follow Up: Yes    Time In: 9:05 a            Time Out:  10:00 a           Visit #: 12  Treatment Charges: Mins Units   Modalities-Par 10 1   Ther Exercise 45 3   Manual Therapy     Thera Activities     ADL/Home Mgt      Neuro Re-education     Gait Training     Group Therapy     Non-Billable Service Time MHP     Other     Total Time/Units 55 4     -Response to Treatment: Tolerating all strengthening well,  fatigued by end of session. Reveiwed all homes exercises this date. PT showed good understanding. Plan:   []  Continues Plan of care: Treatment covered based on POC and graduated to patient's progress. Pt education continues at each visit to obtain maximum benefits from skilled OT intervention.   []  Alter Plan of care:   [x]  Discharge:  With [address], OT/L, CHT

## 2021-12-17 DIAGNOSIS — G56.93 BILATERAL NEUROPATHY OF UPPER EXTREMITIES: ICD-10-CM

## 2021-12-17 RX ORDER — PREGABALIN 200 MG/1
CAPSULE ORAL
Qty: 60 CAPSULE | Refills: 1 | Status: SHIPPED
Start: 2021-12-17 | End: 2022-02-24

## 2022-02-24 DIAGNOSIS — G56.93 BILATERAL NEUROPATHY OF UPPER EXTREMITIES: ICD-10-CM

## 2022-02-24 RX ORDER — PREGABALIN 200 MG/1
CAPSULE ORAL
Qty: 60 CAPSULE | Refills: 1 | Status: SHIPPED
Start: 2022-02-24 | End: 2022-04-18

## 2022-03-31 ENCOUNTER — OFFICE VISIT (OUTPATIENT)
Dept: ORTHOPEDIC SURGERY | Age: 72
End: 2022-03-31
Payer: MEDICARE

## 2022-03-31 VITALS — HEIGHT: 66 IN | BODY MASS INDEX: 25.71 KG/M2 | WEIGHT: 160 LBS

## 2022-03-31 DIAGNOSIS — R20.0 NUMBNESS AND TINGLING IN BOTH HANDS: Primary | ICD-10-CM

## 2022-03-31 DIAGNOSIS — R20.2 NUMBNESS AND TINGLING IN BOTH HANDS: Primary | ICD-10-CM

## 2022-03-31 PROCEDURE — G8427 DOCREV CUR MEDS BY ELIG CLIN: HCPCS | Performed by: ORTHOPAEDIC SURGERY

## 2022-03-31 PROCEDURE — 1036F TOBACCO NON-USER: CPT | Performed by: ORTHOPAEDIC SURGERY

## 2022-03-31 PROCEDURE — 4040F PNEUMOC VAC/ADMIN/RCVD: CPT | Performed by: ORTHOPAEDIC SURGERY

## 2022-03-31 PROCEDURE — 3017F COLORECTAL CA SCREEN DOC REV: CPT | Performed by: ORTHOPAEDIC SURGERY

## 2022-03-31 PROCEDURE — G8484 FLU IMMUNIZE NO ADMIN: HCPCS | Performed by: ORTHOPAEDIC SURGERY

## 2022-03-31 PROCEDURE — 99213 OFFICE O/P EST LOW 20 MIN: CPT | Performed by: ORTHOPAEDIC SURGERY

## 2022-03-31 PROCEDURE — 1123F ACP DISCUSS/DSCN MKR DOCD: CPT | Performed by: ORTHOPAEDIC SURGERY

## 2022-03-31 PROCEDURE — G8417 CALC BMI ABV UP PARAM F/U: HCPCS | Performed by: ORTHOPAEDIC SURGERY

## 2022-03-31 NOTE — PROGRESS NOTES
Chief Complaint   Patient presents with    Follow Up After Procedure     7 months out. left revision of decompression at elbow with excision of neuroma and nerve wrapping and revision submuscular transposition with supercharged end to side AIN to ulnar motor. Rehan Kaufman is a 70y.o. year old  who presents for follow up of 7 months postop:    1. Left revision ulnar nerve decompression at elbow. 2.  Left ulnar nerve external neurolysis. 3.  Ulnar nerve revision submuscular transposition. 4.  Increased level of complexity of ulnar nerve decompression and  neurolysis by 200%. Given the previous surgery, scar tissue, and  altered surgical anatomy increasing the surgical dissection time and  procedure by 200%. 5.  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 6.  Ulnar nerve decompression, in particular the deep motor branch at  level of wrist.  7.  [place] of anterior interosseous nerve for supercharged end-to-side  nerve transfer to ulnar motor branch of ulnar nerve at level of forearm. 8.  Anterior interosseous nerve transfer to ulnar motor branch of ulnar  nerve at level of forearm with the use of intraoperative microscope. 9.  Use of checkpoint nerve stimulator. 10.  Application of Integra nerve protectors; 10 mm and 7 mm in diameter  for a total length of 8 cm. 11.  Use of intraoperative microscope for nerve transfer. He is frustrated with his lack of progress. He continues with weakness of the hand and loss of strength. He is not noticing some flexible claw deformity developing.       Past Medical History:   Diagnosis Date    Arthritis     Insomnia     problems sleeping through the night     Psychiatric problem     Ulnar nerve compression     left     Past Surgical History:   Procedure Laterality Date    ARM SURGERY Left 9/8/2021    LEFT REVISION ULNAR NERVE DECOMPRESSION AT ELBOW WITH SUPERCHARGED AND SIDE NERVE TRANSFER WITH  ANTERIOR INTEROSSEOUS NERVE performed by Olivia Arzola MD at [facility]  2013    bilat hands     CERVICAL FUSION      C6 & C7    COLONOSCOPY      SEPTOPLASTY  2017    FESS    TONSILLECTOMY      UPPER GASTROINTESTINAL ENDOSCOPY         Current Outpatient Medications:     diazePAM (VALIUM) 2 MG tablet, Take 2 mg by mouth nightly as needed for Anxiety. , Disp: , Rfl:     Multiple Vitamins-Minerals (THERAPEUTIC MULTIVITAMIN-MINERALS) tablet, Take 1 tablet by mouth daily, Disp: , Rfl:     Melatonin 5 MG CAPS, Take 1 tablet by mouth nightly, Disp: , Rfl:     NONFORMULARY, Tumeric and clogen, Disp: , Rfl:     vitamin B-1 (THIAMINE) 100 MG tablet, Take 100 mg by mouth daily , Disp: , Rfl:     vitamin E 1000 units capsule, Take by mouth daily , Disp: , Rfl:     Omega-3 Fatty Acids (FISH OIL) 1000 MG CAPS, Take 1,000 mg by mouth 3 times daily , Disp: , Rfl:     pregabalin (LYRICA) 200 MG capsule, TAKE 1 CAPSULE BY MOUTH TWICE DAILY, Disp: 60 capsule, Rfl: 1    VIAGRA 25 MG tablet, Take 1 tablet by mouth as needed for Erectile Dysfunction, Disp: 30 tablet, Rfl: 1  No Known Allergies  Social History     Socioeconomic History    Marital status:      Spouse name: Not on file    Number of children: Not on file    Years of education: Not on file    Highest education level: Not on file   Occupational History    Not on file   Tobacco Use    Smoking status: Former Smoker     Packs/day: 0.25     Years: 3.00     Pack years: 0.75     Quit date: 10/8/2010     Years since quittin.4    Smokeless tobacco: Never Used   Vaping Use    Vaping Use: Never used   Substance and Sexual Activity    Alcohol use:  Yes     Alcohol/week: 6.0 standard drinks     Types: 6 Cans of beer per week     Comment: 6 pack of beer weekly     Drug use: No    Sexual activity: Not on file   Other Topics Concern    Not on file   Social History Narrative    Not on file     Social Determinants of Health     Financial Resource Strain: Low Risk     Difficulty of Paying Living Expenses: Not hard at all   Food Insecurity: No Food Insecurity    Worried About Running Out of Food in the Last Year: Never true    Avis of Food in the Last Year: Never true   Transportation Needs: No Transportation Needs    Lack of Transportation (Medical): No    Lack of Transportation (Non-Medical): No   Physical Activity:     Days of Exercise per Week: Not on file    Minutes of Exercise per Session: Not on file   Stress:     Feeling of Stress : Not on file   Social Connections:     Frequency of Communication with Friends and Family: Not on file    Frequency of Social Gatherings with Friends and Family: Not on file    Attends Mosque Services: Not on file    Active Member of [address] [place] [address] [employer] or Organizations: Not on file    Attends Club or Organization Meetings: Not on file    Marital Status: Not on file   Intimate Partner Violence:     Fear of Current or Ex-Partner: Not on file    Emotionally Abused: Not on file    Physically Abused: Not on file    Sexually Abused: Not on file   Housing Stability:     Unable to Pay for Housing in the Last Year: Not on file    Number of [place] in the Last Year: Not on file    Unstable Housing in the Last Year: Not on file     History reviewed. No pertinent family history. Skin: (-) rash,(-) psoriasis,(-) eczema, (-)skin cancer. Musculoskeletal: Left hand weakness  Neurologic: (-) epilepsy, (-)seizures,(-) brain tumor,(-) TIA, (-)stroke, (-)headaches, (-)Parkinson disease,(-) memory loss, (-) LOC. Cardiovascular: (-) Chest pain, (-) swelling in legs/feet, (-) SOB, (-) cramping in legs/feet with walking. SUBJECTIVE:      Constitutional:    The patient is alert and oriented x 3, appears to be stated age and in no distress. Left upper extremity: Scars well-healed over the medial elbow and forearm. He does have atrophy of the first webspace as well as intrinsic. Positive Wartenberg's sign.   1/5 intrinsic strength. Does have a flexible claw deformity with full flexion present. Absent sensation in the small finger. Chance with sensation of thumb index and middle fingers. Full range of motion of the shoulder elbow wrist and hand. Otherwise neurovascular intact. Impression: 7 months postop left revision ulnar nerve decompression and AIN to supercharged end to side transfer to ulnar motor branch distally. C8/T1 radiculopathy  Double crush injury    Plan: Today's findings were explained the patient. He does have persistent weakness of the hand. He is only 7 months out and there is potential for reinnervation with the nerve transfer as well as healing of the ulnar nerve. I further explained to him that revision surgery is somewhat unpredictable and he may not have improvement in symptomatology. He did inquire about pursuing further work-up of his C8-T1 radiculopathy/double crush injury. I have advised him that he may wish to be evaluated Dr Kaela Adorno for possible intervention on his neck if he desires. He like to see his PCP first and he may make the referral as needed. He may follow-up in 5 months for reevaluation possible nerve recovery.

## 2022-04-18 DIAGNOSIS — G56.93 BILATERAL NEUROPATHY OF UPPER EXTREMITIES: ICD-10-CM

## 2022-04-18 RX ORDER — PREGABALIN 100 MG/1
100 CAPSULE ORAL DAILY
Qty: 60 CAPSULE | Refills: 0 | Status: SHIPPED | OUTPATIENT
Start: 2022-04-18 | End: 2022-05-18

## 2022-09-01 ENCOUNTER — OFFICE VISIT (OUTPATIENT)
Dept: ORTHOPEDIC SURGERY | Age: 72
End: 2022-09-01
Payer: MEDICARE

## 2022-09-01 VITALS — WEIGHT: 155 LBS | BODY MASS INDEX: 22.96 KG/M2 | HEIGHT: 69 IN

## 2022-09-01 DIAGNOSIS — R20.2 NUMBNESS AND TINGLING IN BOTH HANDS: Primary | ICD-10-CM

## 2022-09-01 DIAGNOSIS — R20.0 NUMBNESS AND TINGLING IN BOTH HANDS: Primary | ICD-10-CM

## 2022-09-01 DIAGNOSIS — M62.542 ATROPHY OF LEFT HAND MUSCLES: ICD-10-CM

## 2022-09-01 PROCEDURE — 1123F ACP DISCUSS/DSCN MKR DOCD: CPT | Performed by: ORTHOPAEDIC SURGERY

## 2022-09-01 PROCEDURE — G8420 CALC BMI NORM PARAMETERS: HCPCS | Performed by: ORTHOPAEDIC SURGERY

## 2022-09-01 PROCEDURE — G8427 DOCREV CUR MEDS BY ELIG CLIN: HCPCS | Performed by: ORTHOPAEDIC SURGERY

## 2022-09-01 PROCEDURE — 99213 OFFICE O/P EST LOW 20 MIN: CPT | Performed by: ORTHOPAEDIC SURGERY

## 2022-09-01 PROCEDURE — 3017F COLORECTAL CA SCREEN DOC REV: CPT | Performed by: ORTHOPAEDIC SURGERY

## 2022-09-01 PROCEDURE — 1036F TOBACCO NON-USER: CPT | Performed by: ORTHOPAEDIC SURGERY

## 2022-09-01 NOTE — PROGRESS NOTES
Chief Complaint   Patient presents with    Follow Up After Procedure     1 year out, rt revision ulnar nerve decompression AIN mila Garcia is a 67y.o. year old  who presents for follow up 1 year postop:    1. Left revision ulnar nerve decompression at elbow. 2.  Left ulnar nerve external neurolysis. 3.  Ulnar nerve revision submuscular transposition. 4.  Increased level of complexity of ulnar nerve decompression and  neurolysis by 200%. Given the previous surgery, scar tissue, and  altered surgical anatomy increasing the surgical dissection time and  procedure by 200%. 5.  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 6.  Ulnar nerve decompression, in particular the deep motor branch at  level of wrist.  7.  [place] of anterior interosseous nerve for supercharged end-to-side  nerve transfer to ulnar motor branch of ulnar nerve at level of forearm. 8.  Anterior interosseous nerve transfer to ulnar motor branch of ulnar  nerve at level of forearm with the use of intraoperative microscope. 9.  Use of checkpoint nerve stimulator. 10.  Application of Integra nerve protectors; 10 mm and 7 mm in diameter  for a total length of 8 cm. 11.  Use of intraoperative microscope for nerve transfer. He is frustrated with his lack of progress. He continues with weakness of the hand and loss of strength. He does state his feeling to his small finger has returned over the last couple of months. He does feel a twitch with small finger adduction.        Past Medical History:   Diagnosis Date    Arthritis     Insomnia     problems sleeping through the night     Psychiatric problem     Ulnar nerve compression     left     Past Surgical History:   Procedure Laterality Date    ARM SURGERY Left 9/8/2021    LEFT REVISION ULNAR NERVE DECOMPRESSION AT ELBOW WITH SUPERCHARGED AND SIDE NERVE TRANSFER WITH  ANTERIOR INTEROSSEOUS NERVE performed by Alayna Molina MD at [address] [place] [address] TUNNEL RELEASE  2013    bilat hands     CERVICAL FUSION      C6 & C7    COLONOSCOPY      SEPTOPLASTY  2017    FESS    TONSILLECTOMY      UPPER GASTROINTESTINAL ENDOSCOPY         Current Outpatient Medications:     diazePAM (VALIUM) 2 MG tablet, Take 2 mg by mouth nightly as needed for Anxiety. , Disp: , Rfl:     Multiple Vitamins-Minerals (THERAPEUTIC MULTIVITAMIN-MINERALS) tablet, Take 1 tablet by mouth daily, Disp: , Rfl:     Melatonin 5 MG CAPS, Take 1 tablet by mouth nightly, Disp: , Rfl:     NONFORMULARY, Tumeric and clogen, Disp: , Rfl:     vitamin B-1 (THIAMINE) 100 MG tablet, Take 100 mg by mouth daily , Disp: , Rfl:     vitamin E 1000 units capsule, Take by mouth daily , Disp: , Rfl:     Omega-3 Fatty Acids (FISH OIL) 1000 MG CAPS, Take 1,000 mg by mouth 3 times daily , Disp: , Rfl:     pregabalin (LYRICA) 100 MG capsule, Take 1 capsule by mouth daily for 30 days. , Disp: 60 capsule, Rfl: 0    VIAGRA 25 MG tablet, Take 1 tablet by mouth as needed for Erectile Dysfunction, Disp: 30 tablet, Rfl: 1  No Known Allergies  Social History     Socioeconomic History    Marital status:      Spouse name: Not on file    Number of children: Not on file    Years of education: Not on file    Highest education level: Not on file   Occupational History    Not on file   Tobacco Use    Smoking status: Former     Packs/day: 0.25     Years: 3.00     Pack years: 0.75     Types: Cigarettes     Quit date: 10/8/2010     Years since quittin.9    Smokeless tobacco: Never   Vaping Use    Vaping Use: Never used   Substance and Sexual Activity    Alcohol use:  Yes     Alcohol/week: 6.0 standard drinks     Types: 6 Cans of beer per week     Comment: 6 pack of beer weekly     Drug use: No    Sexual activity: Not on file   Other Topics Concern    Not on file   Social History Narrative    Not on file     Social Determinants of Health     Financial Resource Strain: Not on file   Food Insecurity: Not on file   Transportation Needs: Not on file   Physical Activity: Not on file   Stress: Not on file   Social Connections: Not on file   Intimate Partner Violence: Not on file   Housing Stability: Not on file     History reviewed. No pertinent family history. Skin: (-) rash,(-) psoriasis,(-) eczema, (-)skin cancer. Musculoskeletal: Left hand weakness  Neurologic: negative  Cardiovascular: (-) Chest pain, (-) swelling in legs/feet, (-) SOB, (-) cramping in legs/feet with walking. SUBJECTIVE:      Constitutional:    The patient is alert and oriented x 3, appears to be stated age and in no distress. Left upper extremity: Scars well-healed over the medial elbow and forearm. He does have atrophy of the first webspace as well as intrinsic. Positive Wartenberg's sign. Twitch with small finger adduction. 1/5 intrinsic strength. Does have a flexible claw deformity with full flexion present. Intact sensation to the ring and small finger. Remains with sensation of thumb index and middle fingers. Full range of motion of the shoulder elbow wrist and hand. Otherwise neurovascular intact. Impression:   1 year postop left revision ulnar nerve decompression and AIN to supercharged end to side transfer to ulnar motor branch distally. C8/T1 radiculopathy  Double crush injury    Plan:   Discussed findings with the patient. He does have some positive findings of return of sensation to the small finger along with a twitch with small finger adduction. Muscle wasting still present. Did discuss the difficulty of revision surgery and the unpredictability of revision surgery. Did discuss that he has C8-T1 radiculopathy/double crush injury. Did discuss he may want to have further workup with a spine surgeon. Patient may follow-up as needed in the future. All questions answered      I have seen and evaluated the patient and agree with the above assessment and plan on today's visit.  I have performed the key components of the history and physical examination with significant findings of 1 year out left revision submuscular ulnar nerve decompression with neurolysis and supercharged AIN transfer. Patient reports that he has noticed further atrophy of his first webspace of his left hand. He further relates he does see some improvement in his abductor digit he minimi as well as improvement in his sensation in the ring and little fingers. He was somewhat frustrated at his lack of further recovery. I have explained to him that he does have a very complex problem and that revision ulnar nerve decompression at the elbow does have unpredictable results. He also has a C8-T1 radiculopathy which does overlap with his ulnar nerve distribution and I have further explained that in this setting the results are even less predictable. . I concur with the findings and plan as documented.     [place] MD  9/1/2022

## 2022-10-05 ENCOUNTER — PATIENT MESSAGE (OUTPATIENT)
Dept: PRIMARY CARE CLINIC | Age: 72
End: 2022-10-05

## 2022-10-05 DIAGNOSIS — F41.9 ANXIETY: Primary | ICD-10-CM

## 2022-10-05 RX ORDER — DIAZEPAM 2 MG/1
2 TABLET ORAL EVERY 8 HOURS PRN
Qty: 90 TABLET | Refills: 0 | Status: SHIPPED | OUTPATIENT
Start: 2022-10-05 | End: 2022-10-27

## 2022-10-05 NOTE — TELEPHONE ENCOUNTER
From: Mildred Woodson  To: Dr. Basim Orozco  Sent: 10/5/2022 6:33 AM EDT  Subject: Please renew prescription for Jennyfer Seats    Dear Ben Spann   Can you please renew Diazepam 5 mg Qty 90 at [facility] [phone]. [phone].   Id like to p/u later today, if possible  Thanks, [facility] [phone]

## 2022-10-11 RX ORDER — DIAZEPAM 2 MG/1
2 TABLET ORAL NIGHTLY PRN
Qty: 30 TABLET | Refills: 0 | Status: SHIPPED | OUTPATIENT
Start: 2022-10-11 | End: 2022-11-10

## 2022-11-07 ENCOUNTER — NURSE ONLY (OUTPATIENT)
Dept: PRIMARY CARE CLINIC | Age: 72
End: 2022-11-07
Payer: MEDICARE

## 2022-11-07 ENCOUNTER — TELEPHONE (OUTPATIENT)
Dept: PRIMARY CARE CLINIC | Age: 72
End: 2022-11-07

## 2022-11-07 DIAGNOSIS — Z23 NEED FOR INFLUENZA VACCINATION: Primary | ICD-10-CM

## 2022-11-07 PROCEDURE — G0008 ADMIN INFLUENZA VIRUS VAC: HCPCS | Performed by: INTERNAL MEDICINE

## 2022-11-07 PROCEDURE — 90694 VACC AIIV4 NO PRSRV 0.5ML IM: CPT | Performed by: INTERNAL MEDICINE

## 2022-11-07 NOTE — TELEPHONE ENCOUNTER
----- Message from [place] sent at 85/7/2016  8:44 AM EST -----  Subject: Appointment Request    Reason for Call: Established Patient Appointment needed: Routine Medicare   AWV    QUESTIONS    Reason for appointment request? No appointments available during search     Additional Information for Provider?  AWV appt requested any day this week,   will be traveling next week, last AWV 11/15/2021   ---------------------------------------------------------------------------  --------------  Jocelyn FOSTER  [phone]; OK to leave message on voicemail  ---------------------------------------------------------------------------  --------------  SCRIPT ANSWERS  COVID Screen: Brittany Aguilar

## 2022-11-28 SDOH — HEALTH STABILITY: PHYSICAL HEALTH: ON AVERAGE, HOW MANY DAYS PER WEEK DO YOU ENGAGE IN MODERATE TO STRENUOUS EXERCISE (LIKE A BRISK WALK)?: 4 DAYS

## 2022-11-28 SDOH — HEALTH STABILITY: PHYSICAL HEALTH: ON AVERAGE, HOW MANY MINUTES DO YOU ENGAGE IN EXERCISE AT THIS LEVEL?: 30 MIN

## 2022-11-28 ASSESSMENT — PATIENT HEALTH QUESTIONNAIRE - PHQ9
SUM OF ALL RESPONSES TO PHQ QUESTIONS 1-9: 0
SUM OF ALL RESPONSES TO PHQ QUESTIONS 1-9: 0
1. LITTLE INTEREST OR PLEASURE IN DOING THINGS: 0
SUM OF ALL RESPONSES TO PHQ QUESTIONS 1-9: 0
2. FEELING DOWN, DEPRESSED OR HOPELESS: 0
SUM OF ALL RESPONSES TO PHQ9 QUESTIONS 1 & 2: 0
SUM OF ALL RESPONSES TO PHQ QUESTIONS 1-9: 0

## 2022-11-28 ASSESSMENT — LIFESTYLE VARIABLES
HOW OFTEN DO YOU HAVE A DRINK CONTAINING ALCOHOL: 4
HOW OFTEN DO YOU HAVE SIX OR MORE DRINKS ON ONE OCCASION: 1
HOW OFTEN DO YOU HAVE A DRINK CONTAINING ALCOHOL: 2-3 TIMES A WEEK
HOW MANY STANDARD DRINKS CONTAINING ALCOHOL DO YOU HAVE ON A TYPICAL DAY: 1
HOW MANY STANDARD DRINKS CONTAINING ALCOHOL DO YOU HAVE ON A TYPICAL DAY: 1 OR 2

## 2022-12-05 ENCOUNTER — OFFICE VISIT (OUTPATIENT)
Dept: PRIMARY CARE CLINIC | Age: 72
End: 2022-12-05
Payer: MEDICARE

## 2022-12-05 VITALS
TEMPERATURE: 97.4 F | DIASTOLIC BLOOD PRESSURE: 78 MMHG | HEIGHT: 69 IN | BODY MASS INDEX: 22.66 KG/M2 | WEIGHT: 153 LBS | HEART RATE: 58 BPM | RESPIRATION RATE: 14 BRPM | SYSTOLIC BLOOD PRESSURE: 120 MMHG

## 2022-12-05 DIAGNOSIS — Z00.00 MEDICARE ANNUAL WELLNESS VISIT, SUBSEQUENT: Primary | ICD-10-CM

## 2022-12-05 DIAGNOSIS — Z12.5 PROSTATE CANCER SCREENING: ICD-10-CM

## 2022-12-05 DIAGNOSIS — E78.00 HYPERCHOLESTEROLEMIA: ICD-10-CM

## 2022-12-05 DIAGNOSIS — F41.9 ANXIETY: ICD-10-CM

## 2022-12-05 LAB
ALBUMIN SERPL-MCNC: 4.2 G/DL (ref 3.5–5.2)
ALP BLD-CCNC: 68 U/L (ref 40–129)
ALT SERPL-CCNC: 14 U/L (ref 0–40)
ANION GAP SERPL CALCULATED.3IONS-SCNC: 13 MMOL/L (ref 7–16)
AST SERPL-CCNC: 18 U/L (ref 0–39)
BILIRUB SERPL-MCNC: 0.5 MG/DL (ref 0–1.2)
BUN BLDV-MCNC: 11 MG/DL (ref 6–23)
CALCIUM SERPL-MCNC: 9.6 MG/DL (ref 8.6–10.2)
CHLORIDE BLD-SCNC: 99 MMOL/L (ref 98–107)
CHOLESTEROL, TOTAL: 194 MG/DL (ref 0–199)
CO2: 26 MMOL/L (ref 22–29)
CREAT SERPL-MCNC: 0.8 MG/DL (ref 0.7–1.2)
GFR SERPL CREATININE-BSD FRML MDRD: >60 ML/MIN/1.73
GLUCOSE BLD-MCNC: 98 MG/DL (ref 74–99)
HDLC SERPL-MCNC: 73 MG/DL
LDL CHOLESTEROL CALCULATED: 102 MG/DL (ref 0–99)
POTASSIUM SERPL-SCNC: 5.2 MMOL/L (ref 3.5–5)
PROSTATE SPECIFIC ANTIGEN: 0.47 NG/ML (ref 0–4)
REASON FOR REJECTION: NORMAL
REJECTED TEST: NORMAL
SODIUM BLD-SCNC: 138 MMOL/L (ref 132–146)
TOTAL PROTEIN: 7.3 G/DL (ref 6.4–8.3)
TRIGL SERPL-MCNC: 94 MG/DL (ref 0–149)
VLDLC SERPL CALC-MCNC: 19 MG/DL

## 2022-12-05 PROCEDURE — G0439 PPPS, SUBSEQ VISIT: HCPCS | Performed by: INTERNAL MEDICINE

## 2022-12-05 PROCEDURE — 1123F ACP DISCUSS/DSCN MKR DOCD: CPT | Performed by: INTERNAL MEDICINE

## 2022-12-05 PROCEDURE — G8484 FLU IMMUNIZE NO ADMIN: HCPCS | Performed by: INTERNAL MEDICINE

## 2022-12-05 PROCEDURE — 3017F COLORECTAL CA SCREEN DOC REV: CPT | Performed by: INTERNAL MEDICINE

## 2022-12-05 RX ORDER — DIAZEPAM 2 MG/1
2 TABLET ORAL NIGHTLY PRN
Qty: 30 TABLET | Refills: 0 | Status: SHIPPED
Start: 2022-12-05 | End: 2022-12-05

## 2022-12-05 RX ORDER — DIAZEPAM 5 MG/1
5 TABLET ORAL NIGHTLY PRN
Qty: 90 TABLET | Refills: 0 | Status: SHIPPED | OUTPATIENT
Start: 2022-12-05 | End: 2023-01-03

## 2022-12-05 SDOH — ECONOMIC STABILITY: FOOD INSECURITY: WITHIN THE PAST 12 MONTHS, THE FOOD YOU BOUGHT JUST DIDN'T LAST AND YOU DIDN'T HAVE MONEY TO GET MORE.: NEVER TRUE

## 2022-12-05 SDOH — ECONOMIC STABILITY: FOOD INSECURITY: WITHIN THE PAST 12 MONTHS, YOU WORRIED THAT YOUR FOOD WOULD RUN OUT BEFORE YOU GOT MONEY TO BUY MORE.: NEVER TRUE

## 2022-12-05 ASSESSMENT — SOCIAL DETERMINANTS OF HEALTH (SDOH): HOW HARD IS IT FOR YOU TO PAY FOR THE VERY BASICS LIKE FOOD, HOUSING, MEDICAL CARE, AND HEATING?: NOT HARD AT ALL

## 2022-12-05 NOTE — PATIENT INSTRUCTIONS
Personalized Preventive Plan for Belkis Peter - 12/5/2022  Medicare offers a range of preventive health benefits. Some of the tests and screenings are paid in full while other may be subject to a deductible, co-insurance, and/or copay. Some of these benefits include a comprehensive review of your medical history including lifestyle, illnesses that may run in your family, and various assessments and screenings as appropriate. After reviewing your medical record and screening and assessments performed today your provider may have ordered immunizations, labs, imaging, and/or referrals for you. A list of these orders (if applicable) as well as your Preventive Care list are included within your After Visit Summary for your review. Other Preventive Recommendations:    A preventive eye exam performed by an eye specialist is recommended every 1-2 years to screen for glaucoma; cataracts, macular degeneration, and other eye disorders. A preventive dental visit is recommended every 6 months. Try to get at least 150 minutes of exercise per week or 10,000 steps per day on a pedometer . Order or download the FREE \"Exercise & Physical Activity: Your Everyday Guide\" from [employer]. Call 9-370.555.2704 or search [employer] online. You need 5689-3314 mg of calcium and 8068-1011 IU of vitamin D per day. It is possible to meet your calcium requirement with diet alone, but a vitamin D supplement is usually necessary to meet this goal.  When exposed to the sun, use a sunscreen that protects against both UVA and UVB radiation with an SPF of 30 or greater. Reapply every 2 to 3 hours or after sweating, drying off with a towel, or swimming. Always wear a seat belt when traveling in a car. Always wear a helmet when riding a bicycle or motorcycle.

## 2022-12-05 NOTE — PROGRESS NOTES
Medicare Annual Wellness Visit    Doroteo Serna is here for Medicare AWV    Assessment & Plan   Anxiety  -     diazePAM (VALIUM) 2 MG tablet; Take 1 tablet by mouth nightly as needed for Anxiety for up to 30 days. , Disp-30 tablet, R-0Normal    Recommendations for Preventive Services Due: see orders and patient instructions/AVS.  Recommended screening schedule for the next 5-10 years is provided to the patient in written form: see Patient Instructions/AVS.     No follow-ups on file. Subjective   The following acute and/or chronic problems were also addressed today:  See below      Patient's complete Health Risk Assessment and screening values have been reviewed and are found in Flowsheets. The following problems were reviewed today and where indicated follow up appointments were made and/or referrals ordered.     Positive Risk Factor Screenings with Interventions:             General Health and ACP:  General  In general, how would you say your health is?: Good  In the past 7 days, have you experienced any of the following: New or Increased Pain, New or Increased Fatigue, Loneliness, Social Isolation, Stress or Anger?: No  Do you get the social and emotional support that you need?: Yes  Do you have a Living Will?: (!) No    Advance Directives       Power of  Living Will ACP-Advance Directive ACP-Power of     Not on File Not on File Not on File Not on File        General Health Risk Interventions:  No Living Will: Advance Care Planning addressed with patient today    Health Habits/Nutrition:  Physical Activity: Insufficiently Active    Days of Exercise per Week: 4 days    Minutes of Exercise per Session: 30 min     Have you lost any weight without trying in the past 3 months?: (!) Yes  Body mass index: 22.59  Have you seen the dentist within the past year?: Yes  Health Habits/Nutrition Interventions:  Nutritional issues:  educational materials for healthy, well-balanced diet provided    Hearing/Vision:  Do you or your family notice any trouble with your hearing that hasn't been managed with hearing aids?: No  Do you have difficulty driving, watching TV, or doing any of your daily activities because of your eyesight?: No  Have you had an eye exam within the past year?: (!) No  No results found. Hearing/Vision Interventions:  Vision concerns:  patient encouraged to make appointment with his/her eye specialist    Safety:  Do you have working smoke detectors?: Yes  Do you have any tripping hazards - loose or unsecured carpets or rugs?: No  Do you have any tripping hazards - clutter in doorways, halls, or stairs?: No  Do you have either shower bars, grab bars, non-slip mats or non-slip surfaces in your shower or bathtub?: (!) No  Do all of your stairways have a railing or banister?: (!) No  Do you always fasten your seatbelt when you are in a car?: Yes  Safety Interventions:  Home safety tips provided           Objective   Vitals:    12/05/22 0832   BP: 120/78   Pulse: 58   Resp: 14   Temp: 97.4 °F (36.3 °C)   Weight: 153 lb (69.4 kg)   Height: 5' 9\" (1.753 m)      Body mass index is 22.59 kg/m².       General Appearance: alert and oriented to person, place and time, well developed and well- nourished, in no acute distress  Skin: warm and dry, no rash or erythema  Head: normocephalic and atraumatic  Eyes: pupils equal, round, and reactive to light, extraocular eye movements intact, conjunctivae normal  ENT: tympanic membrane, external ear and ear canal normal bilaterally, nose without deformity, nasal mucosa and turbinates normal without polyps  Neck: supple and non-tender without mass, no thyromegaly or thyroid nodules, no cervical lymphadenopathy  Pulmonary/Chest: clear to auscultation bilaterally- no wheezes, rales or rhonchi, normal air movement, no respiratory distress  Cardiovascular: normal rate, regular rhythm, normal S1 and S2, no murmurs, rubs, clicks, or gallops, distal pulses intact, no carotid bruits  Abdomen: soft, non-tender, non-distended, normal bowel sounds, no masses or organomegaly  Extremities: no cyanosis, clubbing or edema  Musculoskeletal: normal range of motion, no joint swelling, deformity or tenderness  Neurologic: reflexes normal and symmetric, no cranial nerve deficit, gait, coordination and speech normal       No Known Allergies  Prior to Visit Medications    Medication Sig Taking? Authorizing Provider   diazePAM (VALIUM) 2 MG tablet Take 1 tablet by mouth nightly as needed for Anxiety for up to 30 days. Yes Rich Haley DO   Multiple Vitamins-Minerals (THERAPEUTIC MULTIVITAMIN-MINERALS) tablet Take 1 tablet by mouth daily Yes Historical Provider, MD   Melatonin 5 MG CAPS Take 1 tablet by mouth nightly Yes Historical Provider, MD   NONFORMULARY Tumeric and clogen Yes Historical Provider, MD   Omega-3 Fatty Acids (FISH OIL) 1000 MG CAPS Take 1,000 mg by mouth 3 times daily  Yes Historical Provider, MD   pregabalin (LYRICA) 100 MG capsule Take 1 capsule by mouth daily for 30 days. Patient not taking: Reported on 12/5/2022  Claudia Hill DO       [facility] (Including outside providers/suppliers regularly involved in providing care):   Patient Care Team:  Claudia Hill DO as PCP - General (Internal Medicine)  Claudia Hill DO as PCP - REHABILITATION [facility] Empaneled Provider  Sumeet Cintron DO as Consulting Physician (Otolaryngology)     Reviewed and updated this visit:  Tobacco  Allergies  Meds  Med Hx  Surg Hx  Soc Hx  Fam Hx          Appointment he was advised to continue Lyrica. He was referred to physical therapy for his hip and neck pain. We discussed the possibility of repeating his cervical spine MRI. He has been following up with orthopedic hand surgery, we discussed the possibility of following up with a spine surgeon. He is now off of lyrica. His tingling is improving. He still has some weakness.

## 2023-03-15 ENCOUNTER — TELEPHONE (OUTPATIENT)
Dept: PRIMARY CARE CLINIC | Age: 73
End: 2023-03-15

## 2023-03-15 DIAGNOSIS — H91.93 DECREASED HEARING OF BOTH EARS: Primary | ICD-10-CM

## 2023-03-15 NOTE — TELEPHONE ENCOUNTER
Patient needs order for hearing evaluation to be faxed to [facility]. [phone]. Order pended below.

## 2023-06-12 DIAGNOSIS — E78.00 HYPERCHOLESTEROLEMIA: ICD-10-CM

## 2023-06-12 LAB
ALBUMIN SERPL-MCNC: 4.1 G/DL (ref 3.5–5.2)
ALP SERPL-CCNC: 63 U/L (ref 40–129)
ALT SERPL-CCNC: 10 U/L (ref 0–40)
ANION GAP SERPL CALCULATED.3IONS-SCNC: 10 MMOL/L (ref 7–16)
AST SERPL-CCNC: 19 U/L (ref 0–39)
BILIRUB SERPL-MCNC: 0.6 MG/DL (ref 0–1.2)
BUN SERPL-MCNC: 15 MG/DL (ref 6–23)
CALCIUM SERPL-MCNC: 9.2 MG/DL (ref 8.6–10.2)
CHLORIDE SERPL-SCNC: 101 MMOL/L (ref 98–107)
CHOLESTEROL, TOTAL: 202 MG/DL (ref 0–199)
CO2 SERPL-SCNC: 27 MMOL/L (ref 22–29)
CREAT SERPL-MCNC: 0.9 MG/DL (ref 0.7–1.2)
ERYTHROCYTE [DISTWIDTH] IN BLOOD BY AUTOMATED COUNT: 13.8 FL (ref 11.5–15)
GLUCOSE SERPL-MCNC: 96 MG/DL (ref 74–99)
HCT VFR BLD AUTO: 47.3 % (ref 37–54)
HDLC SERPL-MCNC: 76 MG/DL
HGB BLD-MCNC: 14.9 G/DL (ref 12.5–16.5)
LDLC SERPL CALC-MCNC: 107 MG/DL (ref 0–99)
MCH RBC QN AUTO: 29.4 PG (ref 26–35)
MCHC RBC AUTO-ENTMCNC: 31.5 % (ref 32–34.5)
MCV RBC AUTO: 93.3 FL (ref 80–99.9)
PLATELET # BLD AUTO: 255 E9/L (ref 130–450)
PMV BLD AUTO: 11.7 FL (ref 7–12)
POTASSIUM SERPL-SCNC: 5.6 MMOL/L (ref 3.5–5)
PROT SERPL-MCNC: 7.1 G/DL (ref 6.4–8.3)
RBC # BLD AUTO: 5.07 E12/L (ref 3.8–5.8)
SODIUM SERPL-SCNC: 138 MMOL/L (ref 132–146)
TRIGL SERPL-MCNC: 96 MG/DL (ref 0–149)
VLDLC SERPL CALC-MCNC: 19 MG/DL
WBC # BLD: 7 E9/L (ref 4.5–11.5)

## 2023-06-13 DIAGNOSIS — E87.5 HYPERKALEMIA: Primary | ICD-10-CM

## 2023-11-20 DIAGNOSIS — F41.9 ANXIETY: ICD-10-CM

## 2023-11-20 RX ORDER — DIAZEPAM 5 MG/1
5 TABLET ORAL NIGHTLY PRN
Qty: 90 TABLET | Refills: 0 | Status: SHIPPED | OUTPATIENT
Start: 2023-11-20 | End: 2023-12-19

## 2023-12-03 SDOH — HEALTH STABILITY: PHYSICAL HEALTH: ON AVERAGE, HOW MANY MINUTES DO YOU ENGAGE IN EXERCISE AT THIS LEVEL?: 30 MIN

## 2023-12-03 SDOH — HEALTH STABILITY: PHYSICAL HEALTH: ON AVERAGE, HOW MANY DAYS PER WEEK DO YOU ENGAGE IN MODERATE TO STRENUOUS EXERCISE (LIKE A BRISK WALK)?: 4 DAYS

## 2023-12-03 ASSESSMENT — PATIENT HEALTH QUESTIONNAIRE - PHQ9
SUM OF ALL RESPONSES TO PHQ QUESTIONS 1-9: 0
1. LITTLE INTEREST OR PLEASURE IN DOING THINGS: 0
SUM OF ALL RESPONSES TO PHQ QUESTIONS 1-9: 0
SUM OF ALL RESPONSES TO PHQ9 QUESTIONS 1 & 2: 0
2. FEELING DOWN, DEPRESSED OR HOPELESS: 0
SUM OF ALL RESPONSES TO PHQ QUESTIONS 1-9: 0
SUM OF ALL RESPONSES TO PHQ QUESTIONS 1-9: 0

## 2023-12-03 ASSESSMENT — LIFESTYLE VARIABLES
HAS A RELATIVE, FRIEND, DOCTOR, OR ANOTHER HEALTH PROFESSIONAL EXPRESSED CONCERN ABOUT YOUR DRINKING OR SUGGESTED YOU CUT DOWN: 0
HOW OFTEN DO YOU HAVE SIX OR MORE DRINKS ON ONE OCCASION: 1
HOW OFTEN DURING THE LAST YEAR HAVE YOU FOUND THAT YOU WERE NOT ABLE TO STOP DRINKING ONCE YOU HAD STARTED: NEVER
HOW OFTEN DO YOU HAVE A DRINK CONTAINING ALCOHOL: 4 OR MORE TIMES A WEEK
HOW OFTEN DURING THE LAST YEAR HAVE YOU NEEDED AN ALCOHOLIC DRINK FIRST THING IN THE MORNING TO GET YOURSELF GOING AFTER A NIGHT OF HEAVY DRINKING: 0
HAVE YOU OR SOMEONE ELSE BEEN INJURED AS A RESULT OF YOUR DRINKING: NO
HOW MANY STANDARD DRINKS CONTAINING ALCOHOL DO YOU HAVE ON A TYPICAL DAY: 1 OR 2
HOW OFTEN DURING THE LAST YEAR HAVE YOU FAILED TO DO WHAT WAS NORMALLY EXPECTED FROM YOU BECAUSE OF DRINKING: NEVER
HOW OFTEN DURING THE LAST YEAR HAVE YOU HAD A FEELING OF GUILT OR REMORSE AFTER DRINKING: 0
HOW OFTEN DURING THE LAST YEAR HAVE YOU HAD A FEELING OF GUILT OR REMORSE AFTER DRINKING: NEVER
HAS A RELATIVE, FRIEND, DOCTOR, OR ANOTHER HEALTH PROFESSIONAL EXPRESSED CONCERN ABOUT YOUR DRINKING OR SUGGESTED YOU CUT DOWN: NO
HOW OFTEN DURING THE LAST YEAR HAVE YOU NEEDED AN ALCOHOLIC DRINK FIRST THING IN THE MORNING TO GET YOURSELF GOING AFTER A NIGHT OF HEAVY DRINKING: NEVER
HOW OFTEN DURING THE LAST YEAR HAVE YOU BEEN UNABLE TO REMEMBER WHAT HAPPENED THE NIGHT BEFORE BECAUSE YOU HAD BEEN DRINKING: 0
HOW MANY STANDARD DRINKS CONTAINING ALCOHOL DO YOU HAVE ON A TYPICAL DAY: 1
HOW OFTEN DURING THE LAST YEAR HAVE YOU FAILED TO DO WHAT WAS NORMALLY EXPECTED FROM YOU BECAUSE OF DRINKING: 0
HOW OFTEN DURING THE LAST YEAR HAVE YOU FOUND THAT YOU WERE NOT ABLE TO STOP DRINKING ONCE YOU HAD STARTED: 0
HOW OFTEN DO YOU HAVE A DRINK CONTAINING ALCOHOL: 5
HAVE YOU OR SOMEONE ELSE BEEN INJURED AS A RESULT OF YOUR DRINKING: 0
HOW OFTEN DURING THE LAST YEAR HAVE YOU BEEN UNABLE TO REMEMBER WHAT HAPPENED THE NIGHT BEFORE BECAUSE YOU HAD BEEN DRINKING: NEVER

## 2023-12-06 ENCOUNTER — OFFICE VISIT (OUTPATIENT)
Dept: PRIMARY CARE CLINIC | Age: 73
End: 2023-12-06
Payer: MEDICARE

## 2023-12-06 VITALS
OXYGEN SATURATION: 97 % | DIASTOLIC BLOOD PRESSURE: 66 MMHG | BODY MASS INDEX: 23.04 KG/M2 | WEIGHT: 156 LBS | HEART RATE: 60 BPM | SYSTOLIC BLOOD PRESSURE: 128 MMHG | TEMPERATURE: 97.5 F

## 2023-12-06 DIAGNOSIS — E87.5 HYPERKALEMIA: ICD-10-CM

## 2023-12-06 DIAGNOSIS — E78.00 HYPERCHOLESTEROLEMIA: ICD-10-CM

## 2023-12-06 DIAGNOSIS — Z23 NEED FOR INFLUENZA VACCINATION: Primary | ICD-10-CM

## 2023-12-06 DIAGNOSIS — Z00.00 MEDICARE ANNUAL WELLNESS VISIT, SUBSEQUENT: ICD-10-CM

## 2023-12-06 LAB
ALBUMIN SERPL-MCNC: 4.1 G/DL (ref 3.5–5.2)
ALP BLD-CCNC: 65 U/L (ref 40–129)
ALT SERPL-CCNC: 13 U/L (ref 0–40)
ANION GAP SERPL CALCULATED.3IONS-SCNC: 18 MMOL/L (ref 7–16)
AST SERPL-CCNC: 21 U/L (ref 0–39)
BILIRUB SERPL-MCNC: 0.4 MG/DL (ref 0–1.2)
BUN BLDV-MCNC: 15 MG/DL (ref 6–23)
CALCIUM SERPL-MCNC: 9 MG/DL (ref 8.6–10.2)
CHLORIDE BLD-SCNC: 100 MMOL/L (ref 98–107)
CHOLESTEROL: 197 MG/DL
CO2: 23 MMOL/L (ref 22–29)
CREAT SERPL-MCNC: 0.8 MG/DL (ref 0.7–1.2)
GFR SERPL CREATININE-BSD FRML MDRD: >60 ML/MIN/1.73M2
GLUCOSE BLD-MCNC: 99 MG/DL (ref 74–99)
HCT VFR BLD CALC: 44 % (ref 37–54)
HDLC SERPL-MCNC: 84 MG/DL
HEMOGLOBIN: 14.3 G/DL (ref 12.5–16.5)
LDL CHOLESTEROL: 105 MG/DL
MCH RBC QN AUTO: 29.9 PG (ref 26–35)
MCHC RBC AUTO-ENTMCNC: 32.5 G/DL (ref 32–34.5)
MCV RBC AUTO: 91.9 FL (ref 80–99.9)
PDW BLD-RTO: 13.7 % (ref 11.5–15)
PLATELET # BLD: 260 K/UL (ref 130–450)
PMV BLD AUTO: 11.5 FL (ref 7–12)
POTASSIUM SERPL-SCNC: 4.2 MMOL/L (ref 3.5–5)
RBC # BLD: 4.79 M/UL (ref 3.8–5.8)
SODIUM BLD-SCNC: 141 MMOL/L (ref 132–146)
TOTAL PROTEIN: 7.3 G/DL (ref 6.4–8.3)
TRIGL SERPL-MCNC: 41 MG/DL
VLDLC SERPL CALC-MCNC: 8 MG/DL
WBC # BLD: 6.5 K/UL (ref 4.5–11.5)

## 2023-12-06 PROCEDURE — G0439 PPPS, SUBSEQ VISIT: HCPCS | Performed by: INTERNAL MEDICINE

## 2023-12-06 PROCEDURE — 3017F COLORECTAL CA SCREEN DOC REV: CPT | Performed by: INTERNAL MEDICINE

## 2023-12-06 PROCEDURE — G0008 ADMIN INFLUENZA VIRUS VAC: HCPCS | Performed by: INTERNAL MEDICINE

## 2023-12-06 PROCEDURE — G8484 FLU IMMUNIZE NO ADMIN: HCPCS | Performed by: INTERNAL MEDICINE

## 2023-12-06 PROCEDURE — 90694 VACC AIIV4 NO PRSRV 0.5ML IM: CPT | Performed by: INTERNAL MEDICINE

## 2023-12-06 PROCEDURE — 1123F ACP DISCUSS/DSCN MKR DOCD: CPT | Performed by: INTERNAL MEDICINE

## 2024-06-09 ENCOUNTER — PATIENT MESSAGE (OUTPATIENT)
Dept: PRIMARY CARE CLINIC | Age: 74
End: 2024-06-09

## 2024-06-09 DIAGNOSIS — F41.9 ANXIETY: Primary | ICD-10-CM

## 2024-06-10 RX ORDER — DIAZEPAM 5 MG/1
5 TABLET ORAL NIGHTLY
Qty: 90 TABLET | Refills: 0 | Status: SHIPPED | OUTPATIENT
Start: 2024-06-10 | End: 2024-09-08

## 2024-06-10 NOTE — TELEPHONE ENCOUNTER
From: Bonilla Verduzco  To: Dr. Bonilla Haley  Sent: 6/9/2024 10:22 PM EDT  Subject: Elijah Verduzco refill request     Dear Elijah:  Could you please renew prescription   for 90 Diazepam - 5mg  [facility] [phone]  Hope you are doing well  Thanks, Elijah [phone]

## 2024-09-04 DIAGNOSIS — J32.4 CHRONIC PANSINUSITIS: Primary | ICD-10-CM

## 2024-09-04 DIAGNOSIS — H81.10 BPPV (BENIGN PAROXYSMAL POSITIONAL VERTIGO), UNSPECIFIED LATERALITY: ICD-10-CM

## 2024-09-05 ENCOUNTER — TELEPHONE (OUTPATIENT)
Dept: ENT CLINIC | Age: 74
End: 2024-09-05

## 2024-09-05 NOTE — TELEPHONE ENCOUNTER
Patient scheduled 11/1/24 for New pt Ref Dr Haley, BPPV (benign paroxysmal positional vertigo), unspecified laterality, no recent imaging [facility], 4/202, [facility]. Patient complaining of sinus symptoms as well. Patient requesting to move appointment up sooner for evaluation. Please advise patient [phone]

## 2024-09-06 NOTE — TELEPHONE ENCOUNTER
Pt called office stating he would like to be seen sooner if possiable. I advised pt Dr. PENN does not have any sooner appointments but we do have a new physican starting Monday and pt can be seen at the end of this month. Pt refused stating Dr. PENN did his nose surgery and he would like to stay with him. Pt states he can wait.

## 2024-11-01 ENCOUNTER — OFFICE VISIT (OUTPATIENT)
Dept: ENT CLINIC | Age: 74
End: 2024-11-01
Payer: MEDICARE

## 2024-11-01 ENCOUNTER — PROCEDURE VISIT (OUTPATIENT)
Dept: AUDIOLOGY | Age: 74
End: 2024-11-01

## 2024-11-01 VITALS
HEIGHT: 69 IN | HEART RATE: 75 BPM | SYSTOLIC BLOOD PRESSURE: 126 MMHG | WEIGHT: 158 LBS | DIASTOLIC BLOOD PRESSURE: 77 MMHG | BODY MASS INDEX: 23.4 KG/M2

## 2024-11-01 DIAGNOSIS — H90.3 ASYMMETRIC SNHL (SENSORINEURAL HEARING LOSS): ICD-10-CM

## 2024-11-01 DIAGNOSIS — R42 DYSEQUILIBRIUM: Primary | ICD-10-CM

## 2024-11-01 DIAGNOSIS — R42 DIZZINESS: ICD-10-CM

## 2024-11-01 DIAGNOSIS — H90.3 SENSORINEURAL HEARING LOSS (SNHL) OF BOTH EARS: Primary | ICD-10-CM

## 2024-11-01 DIAGNOSIS — H90.3 SENSORINEURAL HEARING LOSS (SNHL) OF BOTH EARS: ICD-10-CM

## 2024-11-01 PROCEDURE — 3017F COLORECTAL CA SCREEN DOC REV: CPT | Performed by: OTOLARYNGOLOGY

## 2024-11-01 PROCEDURE — 1159F MED LIST DOCD IN RCRD: CPT | Performed by: OTOLARYNGOLOGY

## 2024-11-01 PROCEDURE — G8420 CALC BMI NORM PARAMETERS: HCPCS | Performed by: OTOLARYNGOLOGY

## 2024-11-01 PROCEDURE — 1036F TOBACCO NON-USER: CPT | Performed by: OTOLARYNGOLOGY

## 2024-11-01 PROCEDURE — 99204 OFFICE O/P NEW MOD 45 MIN: CPT | Performed by: OTOLARYNGOLOGY

## 2024-11-01 PROCEDURE — 1123F ACP DISCUSS/DSCN MKR DOCD: CPT | Performed by: OTOLARYNGOLOGY

## 2024-11-01 PROCEDURE — G8427 DOCREV CUR MEDS BY ELIG CLIN: HCPCS | Performed by: OTOLARYNGOLOGY

## 2024-11-01 PROCEDURE — G8484 FLU IMMUNIZE NO ADMIN: HCPCS | Performed by: OTOLARYNGOLOGY

## 2024-11-01 RX ORDER — DIAZEPAM 2 MG/1
TABLET ORAL
COMMUNITY

## 2024-11-01 NOTE — PROGRESS NOTES
This patient was referred for audiometric and tympanometric testing by Dr. Mccabe due to dizziness.     Audiometry using pure tone air and bone conduction testing revealed a moderate to severe sensorineural hearing loss, in the right ear. The left ear revealed a mild to severe sensorineural hearing loss. Reliability was good. Speech reception thresholds were in good agreement with the pure tone averages, bilaterally. Speech discrimination scores were poor (56%), in the right ear and poor (44%), in the left ear.    Tympanometry revealed normal middle ear peak pressure and compliance, bilaterally.    The results were reviewed with the patient and ordering provider.     Recommendations for follow up will be made pending ordering provider consult.    Leatha Courtney/CCC-A  [place] Lic [id number]  Electronically signed by Leatha Courtney on 11/1/2024 at 9:43 AM

## 2024-11-01 NOTE — PROGRESS NOTES
Subjective:     Patient ID:  Bonilla Verduzco is a 74 y.o. male.    HPI:  Vertigo - Dizziness  Patient presents with dizziness.  They have not been diagnosed with BPPV in the past. The dizziness has been present for 3 months. The patient describes the symptoms as disequalibirum, lightheadedness, and near syncope. Symptoms are exacerbated by rising from supine position rising from squatting or sitting position   He has been treated with time essentially. Symptoms have resolved. Previous work up has been nothing.    History of Head trauma: no  History of surgery to the head/neck:yes   Type: ACDF c6-c7   when: 15 years ago  History of cerumen impaction: no  History of noise exposure: no   Type: none  Spinning: no  Hearing loss: yes    Fluctuating: no  Aural pressure: no  Tinnitus:yes  Otalgia:no    Past Medical History:   Diagnosis Date    Arthritis     Insomnia     problems sleeping through the night     Psychiatric problem     Ulnar nerve compression     left     Past Surgical History:   Procedure Laterality Date    ARM SURGERY Left 2021    LEFT REVISION ULNAR NERVE DECOMPRESSION AT [place] WITH SUPERCHARGED AND SIDE NERVE TRANSFER WITH  ANTERIOR INTEROSSEOUS NERVE performed by Abhinav Rehman MD at [facility] OR    CARPAL TUNNEL RELEASE  2013    bilat hands     CERVICAL FUSION  2011    C6 & C7    COLONOSCOPY      SEPTOPLASTY  2017    FESS    TONSILLECTOMY      UPPER GASTROINTESTINAL ENDOSCOPY       History reviewed. No pertinent family history.  Social History     Socioeconomic History    Marital status:      Spouse name: None    Number of children: None    Years of education: None    Highest education level: None   Tobacco Use    Smoking status: Former     Current packs/day: 0.00     Average packs/day: 0.3 packs/day for 3.0 years (0.8 ttl pk-yrs)     Types: Cigarettes     Start date: 10/8/2007     Quit date: 10/8/2010     Years since quittin.0    Smokeless tobacco: Never   Vaping Use    Vaping status:

## 2024-11-22 ENCOUNTER — HOSPITAL ENCOUNTER (OUTPATIENT)
Age: 74
Discharge: HOME | End: 2024-11-22
Payer: MEDICARE

## 2024-11-22 DIAGNOSIS — H90.3 ASYMMETRIC SNHL (SENSORINEURAL HEARING LOSS): ICD-10-CM

## 2024-11-22 LAB
BUN SERPL-MCNC: 13 MG/DL (ref 6–23)
CREAT SERPL-MCNC: 0.8 MG/DL (ref 0.7–1.2)
GFR, ESTIMATED: >90 ML/MIN/1.73M2

## 2024-11-22 PROCEDURE — 36415 COLL VENOUS BLD VENIPUNCTURE: CPT

## 2024-11-22 PROCEDURE — 84520 ASSAY OF UREA NITROGEN: CPT

## 2024-11-22 PROCEDURE — 82565 ASSAY OF CREATININE: CPT

## 2024-11-23 ENCOUNTER — HOSPITAL ENCOUNTER (OUTPATIENT)
Dept: MRI IMAGING | Age: 74
End: 2024-11-23
Payer: MEDICARE

## 2024-11-23 DIAGNOSIS — H90.3 ASYMMETRIC SNHL (SENSORINEURAL HEARING LOSS): ICD-10-CM

## 2024-11-23 PROCEDURE — 6360000004 HC RX CONTRAST MEDICATION: Performed by: RADIOLOGY

## 2024-11-23 PROCEDURE — A9579 GAD-BASE MR CONTRAST NOS,1ML: HCPCS | Performed by: RADIOLOGY

## 2024-11-23 PROCEDURE — 70553 MRI BRAIN STEM W/O & W/DYE: CPT

## 2024-11-23 RX ADMIN — GADOTERIDOL 14 ML: 279.3 INJECTION, SOLUTION INTRAVENOUS at 13:08

## 2024-12-03 ENCOUNTER — OFFICE VISIT (OUTPATIENT)
Dept: ENT CLINIC | Age: 74
End: 2024-12-03

## 2024-12-03 VITALS
TEMPERATURE: 97.6 F | SYSTOLIC BLOOD PRESSURE: 133 MMHG | BODY MASS INDEX: 24.14 KG/M2 | HEART RATE: 76 BPM | DIASTOLIC BLOOD PRESSURE: 84 MMHG | HEIGHT: 69 IN | WEIGHT: 163 LBS

## 2024-12-03 DIAGNOSIS — H90.3 ASYMMETRIC SNHL (SENSORINEURAL HEARING LOSS): ICD-10-CM

## 2024-12-03 DIAGNOSIS — R42 DYSEQUILIBRIUM: Primary | ICD-10-CM

## 2024-12-03 DIAGNOSIS — H90.3 SENSORINEURAL HEARING LOSS (SNHL) OF BOTH EARS: ICD-10-CM

## 2024-12-03 RX ORDER — AZELASTINE 1 MG/ML
2 SPRAY, METERED NASAL 2 TIMES DAILY
Qty: 120 ML | Refills: 5 | Status: SHIPPED | OUTPATIENT
Start: 2024-12-03

## 2024-12-03 ASSESSMENT — ENCOUNTER SYMPTOMS
SORE THROAT: 0
SINUS PAIN: 0
SINUS PRESSURE: 0

## 2024-12-03 NOTE — PROGRESS NOTES
packs/day: 0.00     Average packs/day: 0.3 packs/day for 3.0 years (0.8 ttl pk-yrs)     Types: Cigarettes     Start date: 10/8/2007     Quit date: 10/8/2010     Years since quittin.1    Smokeless tobacco: Never   Vaping Use    Vaping status: Never Used   Substance and Sexual Activity    Alcohol use: Yes     Alcohol/week: 6.0 standard drinks of alcohol     Types: 6 Cans of beer per week     Comment: 6 pack of beer weekly      Social Determinants of Health     Financial Resource Strain: Low Risk  (2023)    Overall Financial Resource Strain (CARDIA)     Difficulty of Paying Living Expenses: Not hard at all   Transportation Needs: Unknown (2023)    PRAPARE - Transportation     Lack of Transportation (Non-Medical): No   Physical Activity: Insufficiently Active (12/3/2023)    Exercise Vital Sign     Days of Exercise per Week: 4 days     Minutes of Exercise per Session: 30 min   Housing Stability: Unknown (2023)    Housing Stability Vital Sign     Unstable Housing in the Last Year: No     No Known Allergies        Review of Systems   Constitutional: Negative.    HENT:  Positive for hearing loss. Negative for congestion, ear discharge, ear pain, mouth sores, nosebleeds, sinus pressure, sinus pain, sore throat and tinnitus.    Neurological:  Positive for light-headedness. Negative for dizziness and headaches.               Objective:   Physical Exam  Constitutional:       Appearance: Normal appearance. He is normal weight.   HENT:      Head: Normocephalic and atraumatic.      Right Ear: Tympanic membrane, ear canal and external ear normal.      Left Ear: Ear canal and external ear normal.      Ears:      Comments: Left EAC exostoses      Nose: Nose normal. No nasal deformity or septal deviation (anterior caudal septum displaced to the right).      Right Nostril: No epistaxis.      Left Nostril: No epistaxis.      Right Turbinates: Not enlarged.      Left Turbinates: Not enlarged.      Mouth/Throat:

## 2024-12-14 SDOH — ECONOMIC STABILITY: FOOD INSECURITY: WITHIN THE PAST 12 MONTHS, YOU WORRIED THAT YOUR FOOD WOULD RUN OUT BEFORE YOU GOT MONEY TO BUY MORE.: NEVER TRUE

## 2024-12-14 SDOH — HEALTH STABILITY: PHYSICAL HEALTH: ON AVERAGE, HOW MANY DAYS PER WEEK DO YOU ENGAGE IN MODERATE TO STRENUOUS EXERCISE (LIKE A BRISK WALK)?: 4 DAYS

## 2024-12-14 SDOH — ECONOMIC STABILITY: INCOME INSECURITY: HOW HARD IS IT FOR YOU TO PAY FOR THE VERY BASICS LIKE FOOD, HOUSING, MEDICAL CARE, AND HEATING?: NOT HARD AT ALL

## 2024-12-14 SDOH — ECONOMIC STABILITY: TRANSPORTATION INSECURITY
IN THE PAST 12 MONTHS, HAS LACK OF TRANSPORTATION KEPT YOU FROM MEETINGS, WORK, OR FROM GETTING THINGS NEEDED FOR DAILY LIVING?: NO

## 2024-12-14 SDOH — ECONOMIC STABILITY: FOOD INSECURITY: WITHIN THE PAST 12 MONTHS, THE FOOD YOU BOUGHT JUST DIDN'T LAST AND YOU DIDN'T HAVE MONEY TO GET MORE.: NEVER TRUE

## 2024-12-14 SDOH — HEALTH STABILITY: PHYSICAL HEALTH: ON AVERAGE, HOW MANY MINUTES DO YOU ENGAGE IN EXERCISE AT THIS LEVEL?: 60 MIN

## 2024-12-14 ASSESSMENT — LIFESTYLE VARIABLES
HOW OFTEN DO YOU HAVE A DRINK CONTAINING ALCOHOL: 4
HOW OFTEN DO YOU HAVE SIX OR MORE DRINKS ON ONE OCCASION: 1
HOW MANY STANDARD DRINKS CONTAINING ALCOHOL DO YOU HAVE ON A TYPICAL DAY: 1 OR 2
HOW OFTEN DO YOU HAVE A DRINK CONTAINING ALCOHOL: 2-3 TIMES A WEEK
HOW MANY STANDARD DRINKS CONTAINING ALCOHOL DO YOU HAVE ON A TYPICAL DAY: 1

## 2024-12-14 ASSESSMENT — PATIENT HEALTH QUESTIONNAIRE - PHQ9
1. LITTLE INTEREST OR PLEASURE IN DOING THINGS: NOT AT ALL
SUM OF ALL RESPONSES TO PHQ QUESTIONS 1-9: 0
SUM OF ALL RESPONSES TO PHQ9 QUESTIONS 1 & 2: 0
2. FEELING DOWN, DEPRESSED OR HOPELESS: NOT AT ALL
SUM OF ALL RESPONSES TO PHQ QUESTIONS 1-9: 0

## 2024-12-17 ENCOUNTER — OFFICE VISIT (OUTPATIENT)
Dept: PRIMARY CARE CLINIC | Age: 74
End: 2024-12-17
Payer: MEDICARE

## 2024-12-17 VITALS
WEIGHT: 158 LBS | SYSTOLIC BLOOD PRESSURE: 128 MMHG | BODY MASS INDEX: 23.33 KG/M2 | HEART RATE: 66 BPM | TEMPERATURE: 97.6 F | OXYGEN SATURATION: 93 % | DIASTOLIC BLOOD PRESSURE: 80 MMHG

## 2024-12-17 DIAGNOSIS — F41.9 ANXIETY: ICD-10-CM

## 2024-12-17 DIAGNOSIS — E78.00 HYPERCHOLESTEROLEMIA: ICD-10-CM

## 2024-12-17 DIAGNOSIS — Z00.00 MEDICARE ANNUAL WELLNESS VISIT, SUBSEQUENT: Primary | ICD-10-CM

## 2024-12-17 DIAGNOSIS — Z12.5 PROSTATE CANCER SCREENING: ICD-10-CM

## 2024-12-17 LAB
ALBUMIN: 4.2 G/DL (ref 3.5–5.2)
ALP BLD-CCNC: 66 U/L (ref 40–129)
ALT SERPL-CCNC: 17 U/L (ref 0–40)
ANION GAP SERPL CALCULATED.3IONS-SCNC: 9 MMOL/L (ref 7–16)
AST SERPL-CCNC: 26 U/L (ref 0–39)
BASOPHILS ABSOLUTE: 0.1 K/UL (ref 0–0.2)
BASOPHILS RELATIVE PERCENT: 1 % (ref 0–2)
BILIRUB SERPL-MCNC: 0.5 MG/DL (ref 0–1.2)
BUN BLDV-MCNC: 10 MG/DL (ref 6–23)
CALCIUM SERPL-MCNC: 8.9 MG/DL (ref 8.6–10.2)
CHLORIDE BLD-SCNC: 100 MMOL/L (ref 98–107)
CHOLESTEROL, TOTAL: 192 MG/DL
CO2: 29 MMOL/L (ref 22–29)
CREAT SERPL-MCNC: 0.8 MG/DL (ref 0.7–1.2)
EOSINOPHILS ABSOLUTE: 0.41 K/UL (ref 0.05–0.5)
EOSINOPHILS RELATIVE PERCENT: 6 % (ref 0–6)
GFR, ESTIMATED: >90 ML/MIN/1.73M2
GLUCOSE BLD-MCNC: 92 MG/DL (ref 74–99)
HCT VFR BLD CALC: 45 % (ref 37–54)
HDLC SERPL-MCNC: 74 MG/DL
HEMOGLOBIN: 14.9 G/DL (ref 12.5–16.5)
IMMATURE GRANULOCYTES %: 0 % (ref 0–5)
IMMATURE GRANULOCYTES ABSOLUTE: <0.03 K/UL (ref 0–0.58)
LDL CHOLESTEROL: 106 MG/DL
LYMPHOCYTES ABSOLUTE: 2.33 K/UL (ref 1.5–4)
LYMPHOCYTES RELATIVE PERCENT: 32 % (ref 20–42)
MCH RBC QN AUTO: 30 PG (ref 26–35)
MCHC RBC AUTO-ENTMCNC: 33.1 G/DL (ref 32–34.5)
MCV RBC AUTO: 90.5 FL (ref 80–99.9)
MONOCYTES ABSOLUTE: 0.62 K/UL (ref 0.1–0.95)
MONOCYTES RELATIVE PERCENT: 8 % (ref 2–12)
NEUTROPHILS ABSOLUTE: 3.92 K/UL (ref 1.8–7.3)
NEUTROPHILS RELATIVE PERCENT: 53 % (ref 43–80)
PDW BLD-RTO: 13.4 % (ref 11.5–15)
PLATELET # BLD: 281 K/UL (ref 130–450)
PMV BLD AUTO: 11.7 FL (ref 7–12)
POTASSIUM SERPL-SCNC: 4.7 MMOL/L (ref 3.5–5)
PROSTATE SPECIFIC ANTIGEN: 0.39 NG/ML (ref 0–4)
RBC # BLD: 4.97 M/UL (ref 3.8–5.8)
SODIUM BLD-SCNC: 138 MMOL/L (ref 132–146)
TOTAL PROTEIN: 7.1 G/DL (ref 6.4–8.3)
TRIGL SERPL-MCNC: 60 MG/DL
VLDLC SERPL CALC-MCNC: 12 MG/DL
WBC # BLD: 7.4 K/UL (ref 4.5–11.5)

## 2024-12-17 PROCEDURE — 3017F COLORECTAL CA SCREEN DOC REV: CPT | Performed by: INTERNAL MEDICINE

## 2024-12-17 PROCEDURE — 1159F MED LIST DOCD IN RCRD: CPT | Performed by: INTERNAL MEDICINE

## 2024-12-17 PROCEDURE — G8484 FLU IMMUNIZE NO ADMIN: HCPCS | Performed by: INTERNAL MEDICINE

## 2024-12-17 PROCEDURE — G0439 PPPS, SUBSEQ VISIT: HCPCS | Performed by: INTERNAL MEDICINE

## 2024-12-17 PROCEDURE — 36415 COLL VENOUS BLD VENIPUNCTURE: CPT | Performed by: INTERNAL MEDICINE

## 2024-12-17 PROCEDURE — 1123F ACP DISCUSS/DSCN MKR DOCD: CPT | Performed by: INTERNAL MEDICINE

## 2024-12-17 RX ORDER — SILDENAFIL 50 MG/1
50 TABLET, FILM COATED ORAL PRN
Qty: 12 TABLET | Refills: 0 | Status: SHIPPED | OUTPATIENT
Start: 2024-12-17

## 2024-12-17 RX ORDER — DIAZEPAM 5 MG/1
5 TABLET ORAL NIGHTLY
Qty: 120 TABLET | Refills: 0 | Status: SHIPPED | OUTPATIENT
Start: 2024-12-17 | End: 2025-04-16

## 2024-12-17 NOTE — PATIENT INSTRUCTIONS
to your doctor.   Medicines    Take your medicines exactly as prescribed. Call your doctor if you think you are having a problem with your medicine.     If your doctor recommends aspirin, take the amount directed each day. Make sure you take aspirin and not another kind of pain reliever, such as acetaminophen (Tylenol).   When should you call for help?   Call 911 if you have symptoms of a heart attack. These may include:    Chest pain or pressure, or a strange feeling in the chest.     Sweating.     Shortness of breath.     Pain, pressure, or a strange feeling in the back, neck, jaw, or upper belly or in one or both shoulders or arms.     Lightheadedness or sudden weakness.     A fast or irregular heartbeat.   After you call 911, the  may tell you to chew 1 adult-strength or 2 to 4 low-dose aspirin. Wait for an ambulance. Do not try to drive yourself.  Watch closely for changes in your health, and be sure to contact your doctor if you have any problems.  Where can you learn more?  Go to https://www.[employer].net/patientEd and enter F075 to learn more about \"A Healthy Heart: Care Instructions.\"  Current as of: June 24, 2023  Content Version: 14.2  © 2024 [employer].   Care instructions adapted under license by [employer]. If you have questions about a medical condition or this instruction, always ask your healthcare professional. [employer] disclaims any warranty or liability for your use of this information.      Personalized Preventive Plan for Bonilla Verduzco - 12/17/2024  Medicare offers a range of preventive health benefits. Some of the tests and screenings are paid in full while other may be subject to a deductible, co-insurance, and/or copay.    Some of these benefits include a comprehensive review of your medical history including lifestyle, illnesses that may run in your family, and various assessments and screenings as appropriate.    After reviewing your medical record

## 2024-12-17 NOTE — PROGRESS NOTES
(36.4 °C)   SpO2: 93%   Weight: 71.7 kg (158 lb)      Body mass index is 23.33 kg/m².                No Known Allergies  Prior to Visit Medications    Medication Sig Taking? Authorizing Provider   diazePAM (VALIUM) 5 MG tablet Take 1 tablet by mouth nightly for 120 days. Max Daily Amount: 5 mg Yes Bonilla Haley DO   sildenafil (VIAGRA) 50 MG tablet Take 1 tablet by mouth as needed for Erectile Dysfunction Yes Bonilla Haley DO   azelastine (ASTELIN) 0.1 % nasal spray 2 sprays by Nasal route 2 times daily Use in each nostril as directed  Boris Hamilton DO   diazePAM (VALIUM) 2 MG tablet   ProviderCarli MD   Multiple Vitamins-Minerals (THERAPEUTIC MULTIVITAMIN-MINERALS) tablet Take 1 tablet by mouth daily  Carli Jasso MD   Omega-3 Fatty Acids (FISH OIL) 1000 MG CAPS Take 1 capsule by mouth 3 times daily  ProviderCarli MD       CareTeam (Including outside providers/suppliers regularly involved in providing care):   Patient Care Team:  Bonilla Haley DO as PCP - General (Internal Medicine)  Bonilla Haley DO as PCP - Empaneled Provider  Kin Mccabe DO as Consulting Physician (Otolaryngology)      Reviewed and updated this visit:  Tobacco  Allergies  Meds  Med Hx  Surg Hx  Soc Hx  Fam Hx

## 2024-12-18 NOTE — RESULT ENCOUNTER NOTE
Noted elevated cholesterol.   The 10-year ASCVD risk score (Jennifer KATHLEEN, et al., 2019) is: 20.2%  since their ASCVD risk is > 20%, I would recommend starting a high intensity statin such as Crestor or Lipitor.

## 2025-06-06 ENCOUNTER — PATIENT MESSAGE (OUTPATIENT)
Dept: PRIMARY CARE CLINIC | Age: 75
End: 2025-06-06

## 2025-06-06 DIAGNOSIS — F41.9 ANXIETY: Primary | ICD-10-CM

## 2025-06-06 RX ORDER — DIAZEPAM 2 MG/1
5 TABLET ORAL EVERY 8 HOURS PRN
Qty: 120 TABLET | Refills: 0 | Status: SHIPPED | OUTPATIENT
Start: 2025-06-06 | End: 2025-09-06

## (undated) DEVICE — SURGICAL NERVE STIMULATOR/LOCATOR: Brand: CHECKPOINT

## (undated) DEVICE — SOLUTION IV IRRIG POUR BRL 0.9% SODIUM CHL 2F7124

## (undated) DEVICE — Device

## (undated) DEVICE — SUTURE FIBERLOOP SZ 2-0 L24IN NONABSORBABLE BLU L26.2MM 3/8 AR723202

## (undated) DEVICE — GLOVE SURG SZ 6 THK91MIL LTX FREE SYN POLYISOPRENE ANTI

## (undated) DEVICE — PADDING UNDERCAST W4INXL4YD COT FBR LO LINTING WYTEX

## (undated) DEVICE — INSTRUMENT RETRACTOR ARMY/NAVY REUSABLE

## (undated) DEVICE — PADDING,UNDERCAST,COTTON, 3X4YD STERILE: Brand: MEDLINE

## (undated) DEVICE — LOOP VES W25MM THK1MM MAXI RED SIL FLD REPELLENT 100 PER

## (undated) DEVICE — DOUBLE BASIN SET: Brand: MEDLINE INDUSTRIES, INC.

## (undated) DEVICE — 4-PORT MANIFOLD: Brand: NEPTUNE 2

## (undated) DEVICE — BLADE CLIPPER GEN PURP NS

## (undated) DEVICE — CRADLE ARM W8.75XH12.5XL16IN FOAM SUPP ELEVATION VENT

## (undated) DEVICE — GLOVE ORANGE PI 8 1/2   MSG9085

## (undated) DEVICE — SUTURE FIBERLOOP 4-0 L10IN NONABSORBABLE BLU L17.9MM 3/8 AR722920

## (undated) DEVICE — BNDG,ELSTC,MATRIX,STRL,3"X5YD,LF,HOOK&LP: Brand: MEDLINE

## (undated) DEVICE — SPLINT ORTH W5XL30IN PLSTR OF PARIS FAST IMMOB OF FRAC HI

## (undated) DEVICE — INTENDED FOR TISSUE SEPARATION, AND OTHER PROCEDURES THAT REQUIRE A SHARP SURGICAL BLADE TO PUNCTURE OR CUT.: Brand: BARD-PARKER ® STAINLESS STEEL BLADES

## (undated) DEVICE — GLOVE SURG SZ 65 THK91MIL LTX FREE SYN POLYISOPRENE

## (undated) DEVICE — SURGICAL PROCEDURE PACK HND

## (undated) DEVICE — GOWN,SIRUS,FABRNF,XL,20/CS: Brand: MEDLINE

## (undated) DEVICE — DRAPE,REIN 53X77,STERILE: Brand: MEDLINE

## (undated) DEVICE — ZIMMER® STERILE DISPOSABLE TOURNIQUET CUFF WITH PLC, DUAL PORT, SINGLE BLADDER, 18 IN. (46 CM)

## (undated) DEVICE — MASTISOL ADHESIVE LIQ 2/3ML

## (undated) DEVICE — DRAPE,U/ SHT,SPLIT,PLAS,STERIL: Brand: MEDLINE

## (undated) DEVICE — COVER HNDL LT DISP

## (undated) DEVICE — STRIP,CLOSURE,WOUND,MEDI-STRIP,1/2X4: Brand: MEDLINE

## (undated) DEVICE — SOLUTION IV IRRIG WATER 1000ML POUR BRL 2F7114

## (undated) DEVICE — TRAY SET HAND REUSABLE